# Patient Record
Sex: FEMALE | Race: ASIAN | NOT HISPANIC OR LATINO | Employment: FULL TIME | URBAN - METROPOLITAN AREA
[De-identification: names, ages, dates, MRNs, and addresses within clinical notes are randomized per-mention and may not be internally consistent; named-entity substitution may affect disease eponyms.]

---

## 2017-10-16 ENCOUNTER — TRANSCRIBE ORDERS (OUTPATIENT)
Dept: ADMINISTRATIVE | Facility: HOSPITAL | Age: 47
End: 2017-10-16

## 2017-10-16 DIAGNOSIS — N60.12 FIBROCYSTIC DISEASE OF BOTH BREASTS: Primary | ICD-10-CM

## 2017-10-16 DIAGNOSIS — N60.11 FIBROCYSTIC DISEASE OF BOTH BREASTS: Primary | ICD-10-CM

## 2017-10-30 ENCOUNTER — APPOINTMENT (OUTPATIENT)
Dept: MAMMOGRAPHY | Facility: HOSPITAL | Age: 47
End: 2017-10-30

## 2017-10-30 ENCOUNTER — APPOINTMENT (OUTPATIENT)
Dept: ULTRASOUND IMAGING | Facility: HOSPITAL | Age: 47
End: 2017-10-30

## 2017-11-02 ENCOUNTER — HOSPITAL ENCOUNTER (OUTPATIENT)
Dept: ULTRASOUND IMAGING | Facility: HOSPITAL | Age: 47
Discharge: HOME | End: 2017-11-02

## 2017-11-02 ENCOUNTER — HOSPITAL ENCOUNTER (OUTPATIENT)
Dept: MAMMOGRAPHY | Facility: HOSPITAL | Age: 47
Discharge: HOME | End: 2017-11-02
Admitting: FAMILY MEDICINE

## 2017-11-02 DIAGNOSIS — N60.12 FIBROCYSTIC DISEASE OF BOTH BREASTS: ICD-10-CM

## 2017-11-02 DIAGNOSIS — N60.11 FIBROCYSTIC DISEASE OF BOTH BREASTS: ICD-10-CM

## 2017-11-02 PROCEDURE — 76641 ULTRASOUND BREAST COMPLETE: CPT

## 2017-11-02 PROCEDURE — G0202 SCR MAMMO BI INCL CAD: HCPCS

## 2019-02-20 ENCOUNTER — TRANSCRIBE ORDERS (OUTPATIENT)
Dept: ADMINISTRATIVE | Facility: HOSPITAL | Age: 49
End: 2019-02-20

## 2019-02-20 DIAGNOSIS — E04.1 THYROID NODULE: Primary | ICD-10-CM

## 2019-03-22 ENCOUNTER — HOSPITAL ENCOUNTER (OUTPATIENT)
Dept: MAMMOGRAPHY | Facility: HOSPITAL | Age: 49
Discharge: HOME | End: 2019-03-22
Admitting: FAMILY MEDICINE

## 2019-03-22 ENCOUNTER — HOSPITAL ENCOUNTER (OUTPATIENT)
Dept: ULTRASOUND IMAGING | Facility: HOSPITAL | Age: 49
Discharge: HOME | End: 2019-03-22

## 2019-03-22 DIAGNOSIS — R92.8 ABNORMAL MAMMOGRAM: ICD-10-CM

## 2019-03-22 DIAGNOSIS — Z12.31 ENCOUNTER FOR SCREENING MAMMOGRAM FOR BREAST CANCER: ICD-10-CM

## 2019-03-22 DIAGNOSIS — E04.1 THYROID NODULE: ICD-10-CM

## 2019-03-22 PROCEDURE — 77067 SCR MAMMO BI INCL CAD: CPT

## 2019-03-22 PROCEDURE — 76642 ULTRASOUND BREAST LIMITED: CPT

## 2019-03-22 PROCEDURE — 77063 BREAST TOMOSYNTHESIS BI: CPT

## 2020-05-05 ENCOUNTER — HOSPITAL ENCOUNTER (OUTPATIENT)
Dept: GENERAL RADIOLOGY | Facility: HOSPITAL | Age: 50
Discharge: HOME | End: 2020-05-05

## 2020-05-05 ENCOUNTER — HOSPITAL ENCOUNTER (OUTPATIENT)
Dept: GENERAL RADIOLOGY | Facility: HOSPITAL | Age: 50
Discharge: HOME | End: 2020-05-05
Admitting: FAMILY MEDICINE

## 2020-05-05 DIAGNOSIS — R10.9 ABDOMINAL PAIN, UNSPECIFIED ABDOMINAL LOCATION: ICD-10-CM

## 2020-05-05 DIAGNOSIS — R07.9 CHEST PAIN, UNSPECIFIED TYPE: ICD-10-CM

## 2020-05-05 PROCEDURE — 74018 RADEX ABDOMEN 1 VIEW: CPT

## 2020-05-05 PROCEDURE — 71046 X-RAY EXAM CHEST 2 VIEWS: CPT

## 2020-06-29 ENCOUNTER — TRANSCRIBE ORDERS (OUTPATIENT)
Dept: SLEEP MEDICINE | Facility: HOSPITAL | Age: 50
End: 2020-06-29

## 2020-06-29 ENCOUNTER — LAB (OUTPATIENT)
Dept: LAB | Facility: HOSPITAL | Age: 50
End: 2020-06-29

## 2020-06-29 DIAGNOSIS — Z01.818 OTHER SPECIFIED PRE-OPERATIVE EXAMINATION: ICD-10-CM

## 2020-06-29 DIAGNOSIS — Z01.818 OTHER SPECIFIED PRE-OPERATIVE EXAMINATION: Primary | ICD-10-CM

## 2020-06-29 PROCEDURE — C9803 HOPD COVID-19 SPEC COLLECT: HCPCS

## 2020-06-29 PROCEDURE — U0004 COV-19 TEST NON-CDC HGH THRU: HCPCS

## 2020-06-30 LAB
REF LAB TEST METHOD: NORMAL
SARS-COV-2 RNA RESP QL NAA+PROBE: NOT DETECTED

## 2020-11-29 ENCOUNTER — APPOINTMENT (OUTPATIENT)
Dept: GENERAL RADIOLOGY | Facility: HOSPITAL | Age: 50
End: 2020-11-29

## 2020-11-29 ENCOUNTER — HOSPITAL ENCOUNTER (EMERGENCY)
Facility: HOSPITAL | Age: 50
Discharge: HOME | End: 2020-11-29
Attending: EMERGENCY MEDICINE | Admitting: EMERGENCY MEDICINE

## 2020-11-29 ENCOUNTER — APPOINTMENT (OUTPATIENT)
Dept: CARDIOLOGY | Facility: HOSPITAL | Age: 50
End: 2020-11-29

## 2020-11-29 VITALS
DIASTOLIC BLOOD PRESSURE: 102 MMHG | TEMPERATURE: 98.5 F | BODY MASS INDEX: 27.38 KG/M2 | RESPIRATION RATE: 17 BRPM | SYSTOLIC BLOOD PRESSURE: 136 MMHG | HEIGHT: 61 IN | HEART RATE: 73 BPM | WEIGHT: 145 LBS | OXYGEN SATURATION: 98 %

## 2020-11-29 DIAGNOSIS — S83.402A SPRAIN OF COLLATERAL LIGAMENT OF LEFT KNEE, INITIAL ENCOUNTER: Primary | ICD-10-CM

## 2020-11-29 LAB
BH CV LOWER VASCULAR LEFT COMMON FEMORAL AUGMENT: NORMAL
BH CV LOWER VASCULAR LEFT COMMON FEMORAL COMPETENT: NORMAL
BH CV LOWER VASCULAR LEFT COMMON FEMORAL COMPRESS: NORMAL
BH CV LOWER VASCULAR LEFT COMMON FEMORAL PHASIC: NORMAL
BH CV LOWER VASCULAR LEFT COMMON FEMORAL SPONT: NORMAL
BH CV LOWER VASCULAR LEFT DISTAL FEMORAL COMPRESS: NORMAL
BH CV LOWER VASCULAR LEFT GASTRONEMIUS COMPRESS: NORMAL
BH CV LOWER VASCULAR LEFT GREATER SAPH AK COMPRESS: NORMAL
BH CV LOWER VASCULAR LEFT GREATER SAPH BK COMPRESS: NORMAL
BH CV LOWER VASCULAR LEFT LESSER SAPH COMPRESS: NORMAL
BH CV LOWER VASCULAR LEFT MID FEMORAL AUGMENT: NORMAL
BH CV LOWER VASCULAR LEFT MID FEMORAL COMPETENT: NORMAL
BH CV LOWER VASCULAR LEFT MID FEMORAL COMPRESS: NORMAL
BH CV LOWER VASCULAR LEFT MID FEMORAL PHASIC: NORMAL
BH CV LOWER VASCULAR LEFT MID FEMORAL SPONT: NORMAL
BH CV LOWER VASCULAR LEFT PERONEAL COMPRESS: NORMAL
BH CV LOWER VASCULAR LEFT POPLITEAL AUGMENT: NORMAL
BH CV LOWER VASCULAR LEFT POPLITEAL COMPETENT: NORMAL
BH CV LOWER VASCULAR LEFT POPLITEAL COMPRESS: NORMAL
BH CV LOWER VASCULAR LEFT POPLITEAL PHASIC: NORMAL
BH CV LOWER VASCULAR LEFT POPLITEAL SPONT: NORMAL
BH CV LOWER VASCULAR LEFT POSTERIOR TIBIAL COMPRESS: NORMAL
BH CV LOWER VASCULAR LEFT PROFUNDA FEMORAL COMPRESS: NORMAL
BH CV LOWER VASCULAR LEFT PROXIMAL FEMORAL COMPRESS: NORMAL
BH CV LOWER VASCULAR LEFT SAPHENOFEMORAL JUNCTION COMPRESS: NORMAL
BH CV LOWER VASCULAR RIGHT COMMON FEMORAL AUGMENT: NORMAL
BH CV LOWER VASCULAR RIGHT COMMON FEMORAL COMPETENT: NORMAL
BH CV LOWER VASCULAR RIGHT COMMON FEMORAL COMPRESS: NORMAL
BH CV LOWER VASCULAR RIGHT COMMON FEMORAL PHASIC: NORMAL
BH CV LOWER VASCULAR RIGHT COMMON FEMORAL SPONT: NORMAL

## 2020-11-29 PROCEDURE — 73560 X-RAY EXAM OF KNEE 1 OR 2: CPT

## 2020-11-29 PROCEDURE — 99282 EMERGENCY DEPT VISIT SF MDM: CPT

## 2020-11-29 PROCEDURE — 93971 EXTREMITY STUDY: CPT

## 2020-11-29 RX ORDER — ACETAMINOPHEN,DIPHENHYDRAMINE HCL 500; 25 MG/1; MG/1
1 TABLET, FILM COATED ORAL NIGHTLY PRN
COMMUNITY
End: 2021-08-19

## 2020-11-29 RX ORDER — PANTOPRAZOLE SODIUM 40 MG/1
40 TABLET, DELAYED RELEASE ORAL DAILY
COMMUNITY
End: 2021-02-08

## 2020-11-29 RX ORDER — FAMOTIDINE 20 MG/1
20 TABLET, FILM COATED ORAL 2 TIMES DAILY
COMMUNITY
End: 2021-03-16

## 2020-11-29 RX ORDER — DICLOFENAC SODIUM 75 MG/1
75 TABLET, DELAYED RELEASE ORAL 2 TIMES DAILY PRN
Qty: 15 TABLET | Refills: 0 | Status: SHIPPED | OUTPATIENT
Start: 2020-11-29 | End: 2020-12-08 | Stop reason: SDUPTHER

## 2020-11-30 ENCOUNTER — EPISODE CHANGES (OUTPATIENT)
Dept: CASE MANAGEMENT | Facility: OTHER | Age: 50
End: 2020-11-30

## 2020-12-07 ENCOUNTER — OFFICE VISIT (OUTPATIENT)
Dept: ORTHOPEDIC SURGERY | Facility: CLINIC | Age: 50
End: 2020-12-07

## 2020-12-07 VITALS — TEMPERATURE: 98.8 F | BODY MASS INDEX: 27.38 KG/M2 | WEIGHT: 145 LBS | HEIGHT: 61 IN

## 2020-12-07 DIAGNOSIS — M25.462 EFFUSION OF LEFT KNEE: ICD-10-CM

## 2020-12-07 DIAGNOSIS — M25.562 MECHANICAL KNEE PAIN, LEFT: Primary | ICD-10-CM

## 2020-12-07 PROCEDURE — 99203 OFFICE O/P NEW LOW 30 MIN: CPT | Performed by: NURSE PRACTITIONER

## 2020-12-07 RX ORDER — IBUPROFEN 800 MG/1
800 TABLET ORAL DAILY
COMMUNITY
End: 2021-02-08

## 2020-12-07 RX ORDER — TEMAZEPAM 30 MG/1
30 CAPSULE ORAL
COMMUNITY
Start: 2020-12-01

## 2020-12-07 NOTE — PROGRESS NOTES
Patient Name: Nisha Beck   YOB: 1970  Referring Primary Care Physician: Reyes, Miriam Mercado, MD  BMI: Body mass index is 27.4 kg/m².    Chief Complaint:    Chief Complaint   Patient presents with   • Right Knee - Pain        HPI: Pain in left knee and was seen in ER and had a neg Doppler work up and has been taking naproxen and is still having locking and catching . Pt is a nurse at [employer].  She has tried conservative treatment including rest ice elevation NSAIDs and exercises.    Nisha Beck is a 50 y.o. female who presents today for evaluation of   Chief Complaint   Patient presents with   • Right Knee - Pain         Subjective   Medications:   Home Medications:  Current Outpatient Medications on File Prior to Visit   Medication Sig   • Ascorbic Acid (VITAMIN C PO) Take  by mouth.   • BACLOFEN PO Take  by mouth.   • Cholecalciferol (VITAMIN D3 PO) Take  by mouth.   • diclofenac (VOLTAREN) 75 MG EC tablet Take 1 tablet by mouth 2 (Two) Times a Day As Needed (knee pain).   • diphenhydrAMINE-acetaminophen (TYLENOL PM)  MG tablet per tablet Take 1 tablet by mouth Every Night.   • ELDERBERRY PO Take  by mouth.   • famotidine (PEPCID) 20 MG tablet Take 20 mg by mouth 2 (Two) Times a Day.   • ibuprofen (ADVIL,MOTRIN) 800 MG tablet Take 800 mg by mouth Daily.   • Multiple Vitamins-Minerals (ZINC PO) Take 50 mg by mouth.   • pantoprazole (PROTONIX) 40 MG EC tablet Take 40 mg by mouth Daily.   • temazepam (RESTORIL) 30 MG capsule Take 30 mg by mouth every night at bedtime.   • VITAMIN E PO Take  by mouth.     No current facility-administered medications on file prior to visit.      Current Medications:  Scheduled Meds:  Continuous Infusions:No current facility-administered medications for this visit.     PRN Meds:.    I have reviewed the patient's medical history in detail and updated the computerized patient record.  Review and summarization of old records includes:    Past Medical History:  "  Diagnosis Date   • GERD (gastroesophageal reflux disease)         Past Surgical History:   Procedure Laterality Date   • CYST REMOVAL      right side of neck   • ESSURE TUBAL LIGATION          Social History     Occupational History   • Not on file   Tobacco Use   • Smoking status: Former Smoker   • Smokeless tobacco: Never Used   Substance and Sexual Activity   • Alcohol use: Not on file   • Drug use: Not on file   • Sexual activity: Not on file      Social History     Social History Narrative   • Not on file        Family History   Problem Relation Age of Onset   • Stroke Mother    • Hypertension Mother    • Other Mother         GI bleed   • Heart disease Father    • Liver disease Father    • Diabetes Father        ROS: 14 point review of systems was performed and all other systems were reviewed and are negative except for documented findings in HPI and today's encounter.     Allergies:   Allergies   Allergen Reactions   • Celebrex [Celecoxib] Anaphylaxis     Constitutional:  Denies fever, shaking or chills   Eyes:  Denies change in visual acuity   HENT:  Denies nasal congestion or sore throat   Respiratory:  Denies cough or shortness of breath   Cardiovascular:  Denies chest pain or severe LE edema   GI:  Denies abdominal pain, nausea, vomiting, bloody stools or diarrhea   Musculoskeletal:  Numbness, tingling, pain, or loss of motor function only as noted above in history of present illness.  : Denies painful urination or hematuria  Integument:  Denies rash, lesion or ulceration   Neurologic:  Denies headache or focal weakness  Endocrine:  Denies lymphadenopathy  Psych:  Denies confusion or change in mental status   Hem:  Denies active bleeding    OBJECTIVE:  Physical Exam: 50 y.o. female  Wt Readings from Last 3 Encounters:   12/07/20 65.8 kg (145 lb)   11/29/20 65.8 kg (145 lb)     Ht Readings from Last 1 Encounters:   12/07/20 154.9 cm (61\")     Body mass index is 27.4 kg/m².  Vitals:    12/07/20 1514 "   Temp: 98.8 °F (37.1 °C)     Vital signs reviewed.     General Appearance:    Alert, cooperative, in no acute distress                  Eyes: conjunctiva clear  ENT: external ears and nose atraumatic  CV: no peripheral edema  Resp: normal respiratory effort  Skin: no rashes or wounds; normal turgor  Psych: mood and affect appropriate  Lymph: no nodes appreciated  Neuro: gross sensation intact  Vascular:  Palpable peripheral pulse in noted extremity  Musculoskeletal Extremities: left knee with +mcmurrays and medial joint line pain and effusion, calf soft nttp, ankle nttp, ambulates with no assistive devices and antalgic gait    Radiology:   TWO-VIEW LEFT KNEE     HISTORY: Pain and swelling. No trauma.     There may be some very minimal generalized joint space narrowing. The  knee is otherwise unremarkable. There is no joint effusion.     This report was finalized on 11/29/2020 8:43 AM by Dr. Chago Sotelo M.D.         Assessment:     ICD-10-CM ICD-9-CM   1. Mechanical knee pain, left  M25.562 719.46   2. Effusion of left knee  M25.462 719.06        Procedures   MRI left knee to check for meniscus tear - pt is a nurse at [address]     Plan: The diagnosis(es), natural history, pathophysiology and treatment for diagnosis(es) were discussed. Opportunity given and questions answered.  Biomechanics of pertinent body areas discussed.  When appropriate, the use of ambulatory aids discussed.  EXERCISES:  Advice on benefits of, and types of regular/moderate exercise pertaining to orthopedic diagnosis(es).  Inflammation/pain control; with cold, heat, elevation and/or liniments discussed as appropriate  MRI.  MEDICAL RECORDS reviewed from other provider(s) for past and current medical history pertinent to this complaint.      12/7/2020    Much of this encounter note is an electronic transcription/translation of spoken language to printed text. The electronic translation of spoken language may permit erroneous, or at times,  nonsensical words or phrases to be inadvertently transcribed; Although I have reviewed the note for such errors, some may still exist

## 2020-12-09 NOTE — TELEPHONE ENCOUNTER
Spoke with patient; she said the diclofenac helps some but was only given a 2 week supply but she is asking wanting to know if she can get a muscle relaxer so she can work.  I told her I would check with [facility] and call her back tomorrow.

## 2020-12-10 ENCOUNTER — TELEPHONE (OUTPATIENT)
Dept: ORTHOPEDIC SURGERY | Facility: CLINIC | Age: 50
End: 2020-12-10

## 2020-12-10 RX ORDER — DICLOFENAC SODIUM 75 MG/1
75 TABLET, DELAYED RELEASE ORAL 2 TIMES DAILY PRN
Qty: 30 TABLET | Refills: 0 | Status: SHIPPED | OUTPATIENT
Start: 2020-12-10 | End: 2020-12-10 | Stop reason: SDUPTHER

## 2020-12-10 RX ORDER — DICLOFENAC SODIUM 75 MG/1
75 TABLET, DELAYED RELEASE ORAL 2 TIMES DAILY PRN
Qty: 30 TABLET | Refills: 0 | Status: SHIPPED | OUTPATIENT
Start: 2020-12-10 | End: 2021-03-16

## 2020-12-10 NOTE — TELEPHONE ENCOUNTER
I called pt and discussed with her. She is having mechanical symptoms and may have a meniscus tear. 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 Please see if her MRI can be moved up at all. cim

## 2020-12-10 NOTE — TELEPHONE ENCOUNTER
See my message from yesterday, mellissa.  You may want to call her, she didn't seem clear on the anti-inflammatory and muscle relaxers.

## 2020-12-10 NOTE — TELEPHONE ENCOUNTER
My chart message      ----- Message from Nisha Beck sent at 12/10/2020 12:51 PM EST -----  Regarding: Visit Follow-Up Question  Contact: [phone]  Hi Ms. Romero,    I talked to my manager Ana Rodney and she said i can file for fmla if i needed to be off work.  If you think it will be best for me to not work until mri is done, i will.  I got scared about surgery, how to i get the off work note? I will file the fmla later.  Thanks so much for the help and concern. Having my 4 yr old keeps me on my feet already... tried to call there earlier but they transferred me to the fmla person which did not answer.      Hoping my leg will be ok, again thanks for rhe help.    Yours,  Nisha Beck

## 2020-12-10 NOTE — TELEPHONE ENCOUNTER
We will not know about surgery until we get the MRI. If pt can not work this wkd and is in too much pain and having locking and catching I am happy to write her a note off. We can start FMLA if she would like. See if they can move up MRI. cim

## 2020-12-15 ENCOUNTER — TELEPHONE (OUTPATIENT)
Dept: ORTHOPEDIC SURGERY | Facility: CLINIC | Age: 50
End: 2020-12-15

## 2020-12-15 NOTE — TELEPHONE ENCOUNTER
----- Message from Nisha Beck sent at 12/14/2020 10:05 PM EST -----  Regarding: RE: Visit Follow-Up Question  Contact: [phone]  Chilo Roblero,    I was able to drop off the FMLA papers at [address], the lady said they will forward it to Aleksandar.  Once completed, i will be faxing it to [employer], right? Marta made a ff up earlier.    Thanks.  Nisha    ----- Message -----  From: LARA MARTINEZ  Sent: 12/14/20, 08:58  To: Nisha Beck  Subject: RE: Visit Follow-Up Question    No problem.  Your paperwork will need to be dropped off to [facility] in [address].  She will fill them out and let you know when they're ready.    Annika      ----- Message -----       From:Nisha Beck       Sent:12/12/2020  8:13 PM EST         To:SHAWN Bangura    Subject:RE: Visit Follow-Up Question    Hi,    I got the work note already.  Thanks.  And thank you for helping me get my mri on the 16th.  I have one more question, do i just drop off the fmla papers that Ms Heather Barron need to sign?      Thanks again,  Nisha      ----- Message -----       From:LARA MARTINEZ       Sent:12/10/2020  4:49 PM EST         To:Nisha Beck    Subject:RE: Visit Follow-Up Question    The [facility] office will be closed tomorrow so you will have to go to [address] to pick it up. The letter is in your chart so they will have to print it out for you.  Did you see my message about your MRI?      ----- Message -----       From:Nisha Beck       Sent:12/10/2020  4:45 PM EST         To:SHAWN Bangura    Subject:RE: Visit Follow-Up Question    Chilo Spence,    We went there today. The  said it was not there and sent my son downstairs, then they sent him again up the office and it is close already. Can i pick it up tomorrow, Friday 12/11.  Thanks.        ----- Message -----       From:LARA MARTINEZ       Sent:12/10/2020  3:14 PM EST         To:Nisha Beck    Subject:RE: Visit Follow-Up Question    Your work note is ready, would you like to pick it  up or have it mailed?    Annika      ----- Message -----       From:Nisha Beck       Sent:12/10/2020  3:01 PM EST         To:SHAWN Bangura    Subject:Visit Follow-Up Question    Yes please, i will need a the note for off work.  Thanks again so much.

## 2020-12-16 ENCOUNTER — HOSPITAL ENCOUNTER (OUTPATIENT)
Dept: MRI IMAGING | Facility: HOSPITAL | Age: 50
Discharge: HOME | End: 2020-12-16
Admitting: NURSE PRACTITIONER

## 2020-12-16 DIAGNOSIS — M25.562 MECHANICAL KNEE PAIN, LEFT: ICD-10-CM

## 2020-12-16 PROCEDURE — 73721 MRI JNT OF LWR EXTRE W/O DYE: CPT

## 2020-12-17 ENCOUNTER — TELEPHONE (OUTPATIENT)
Dept: ORTHOPEDIC SURGERY | Facility: CLINIC | Age: 50
End: 2020-12-17

## 2020-12-17 NOTE — TELEPHONE ENCOUNTER
----- Message from SHAWN Bangura sent at 12/16/2020  4:22 PM EST -----  Arthritic changes under patella and meniscus tear - have follow with one of the knee surgeons cim

## 2020-12-17 NOTE — TELEPHONE ENCOUNTER
Pt informed of results and was scheduled to see ISAAC on 1/8/21.  She asked about returning to work and I told her that Heather said she could as long as she felt safe and comfortable doing so.  I offered to bring in her today to see [facility] for an injection but she stated she was going out of town.

## 2020-12-23 ENCOUNTER — APPOINTMENT (OUTPATIENT)
Dept: MRI IMAGING | Facility: HOSPITAL | Age: 50
End: 2020-12-23

## 2021-01-07 NOTE — PROGRESS NOTES
New Left Knee      Patient: Nisha Beck        YOB: 1970    Medical Record Number: [id number]        Chief Complaints: left knee pain      History of Present Illness: This is a 50-year-old female who works over [employer] who presents complaining of left knee pain been ongoing for a month no history injury change in activity is primarily lateral and also superiorly she does get some swelling release did initially she saw Heather who ordered an MRI.  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   • Cholecalciferol (VITAMIN D3 PO) Take  by mouth.   • Collagen-Boron-Hyaluronic Acid 10-5-3.3 MG tablet Take  by mouth.   • diclofenac (VOLTAREN) 75 MG EC tablet Take 1 tablet by mouth 2 (Two) Times a Day As Needed (knee pain).   • ELDERBERRY PO Take  by mouth.   • ibuprofen (ADVIL,MOTRIN) 800 MG tablet Take 800 mg by mouth Daily.   • Multiple Vitamins-Minerals (ZINC PO) Take 50 mg by mouth.   • pantoprazole (PROTONIX) 40 MG EC tablet Take 40 mg by mouth Daily.   • temazepam (RESTORIL) 30 MG capsule Take 30 mg by mouth every night at bedtime.   • VITAMIN E PO Take  by mouth.   • BACLOFEN PO Take  by mouth.   • diphenhydrAMINE-acetaminophen (TYLENOL PM)  MG tablet per tablet Take 1 tablet by mouth Every Night.   • famotidine (PEPCID) 20 MG tablet Take 20 mg by mouth 2 (Two) Times a Day.     No current facility-administered medications on file prior to visit.      Current Medications:  Scheduled Meds:  Continuous Infusions:No current facility-administered medications for this visit.     PRN Meds:.    Past Medical History:  "  Diagnosis Date   • GERD (gastroesophageal reflux disease)         Past Surgical History:   Procedure Laterality Date   • CYST REMOVAL      right side of neck   • ESSURE TUBAL LIGATION          Social History     Occupational History   • Not on file   Tobacco Use   • Smoking status: Former Smoker   • Smokeless tobacco: Never Used   Substance and Sexual Activity   • Alcohol use: Defer   • Drug use: Defer   • Sexual activity: Defer      Social History     Social History Narrative   • Not on file        Family History   Problem Relation Age of Onset   • Stroke Mother    • Hypertension Mother    • Other Mother         GI bleed   • Heart disease Father    • Liver disease Father    • Diabetes Father              Review of Systems: 14 point review of systems are remarkable for the pertinent positives listed in the chart by the patient the remainder negative    Review of Systems      Physical Exam: 50 y.o. female  General Appearance:    Alert, cooperative, in no acute distress                   Vitals:    01/08/21 0942   Temp: 98.2 °F (36.8 °C)   Weight: 66.2 kg (146 lb)   Height: 154.9 cm (61\")   PainSc:   5      Patient is alert and read ×3 no acute distress appears her above-listed at height weight and age.  Affect is normal respiratory rate is normal unlabored. Heart rate regular rate rhythm, sclera, dentition and hearing are normal for the purpose of this exam.        Ortho Exam  Physical exam of the left knee reveals no effusion, no erythema.  The patient has no palpable tenderness along the medial joint line, no tenderness about the lateral joint line.  Patient does have crepitus with patellofemoral range of motion.  They also have subjective symptoms anteriorly during exam.  The patient has a negative bounce home, negative Rolando and a stable ligamentous exam.  Quad tone is reasonable and symmetric.  There are no overlying skin changes no lymphedema no lymphadenopathy.  There is good hip range of motion which is " full symmetric and asymptomatic and a normal ankle exam.  Hamstrings and IT band are tight bilaterally.    Large Joint Arthrocentesis: L knee  Date/Time: 1/8/2021 10:22 AM  Consent given by: patient  Site marked: site marked  Timeout: Immediately prior to procedure a time out was called to verify the correct patient, procedure, equipment, support staff and site/side marked as required   Supporting Documentation  Indications: pain   Procedure Details  Location: knee - L knee  Preparation: Patient was prepped and draped in the usual sterile fashion  Needle size: 22 G  Approach: anteromedial  Medications administered: 4 mL lidocaine (cardiac); 80 mg methylPREDNISolone acetate 80 MG/ML  Patient tolerance: patient tolerated the procedure well with no immediate complications                   Radiology:   AP, of the left knee  were ordered/reviewed to evauateknee pain.  I also reviewed x-rays taken and reviewed in epic for AP and lateral I did take a standing AP also have a merchant view these look normal she has some very mild patellofemoral OA.  She does have an MRI there which I also reviewed which shows mild tricompartmental OA but still good maintenance of her joint space x3 she has some changes of free edge of lateral meniscus that may well represent a tear  Imaging Results (Most Recent)     Procedure Component Value Units Date/Time    XR Knee 1 or 2 View Bilateral [[id number]] Resulted: 01/08/21 1020     Updated: 01/08/21 1020    Impression:      Ordering physician's impression is located in the Encounter Note dated 01/08/21. X-ray performed in the DR room.          Assessment/Plan:        Left knee pain a lot of this seems patellofemoral.  She does have lateral knee pain so this could be coming from a lateral meniscus tear at this point I had like to try conservative measures including an injection possibly a J brace and aggressive physical therapy if she fails to improve could consider  arthroscopy                          Answers for HPI/ROS submitted by the patient on 1/6/2021   What is the primary reason for your visit?: Other  Please describe your symptoms.: Pain and swelling at left knee and upper knee  Have you had these symptoms before?: No  How long have you been having these symptoms?: Greater than 2 weeks  Please list any medications you are currently taking for this condition.: Diclofenac, Ibuprofen, acetaminophen, icy hot cream  Please describe any probable cause for these symptoms. : Arthritis?

## 2021-01-08 ENCOUNTER — OFFICE VISIT (OUTPATIENT)
Dept: ORTHOPEDIC SURGERY | Facility: CLINIC | Age: 51
End: 2021-01-08

## 2021-01-08 VITALS — WEIGHT: 146 LBS | TEMPERATURE: 98.2 F | BODY MASS INDEX: 27.56 KG/M2 | HEIGHT: 61 IN

## 2021-01-08 DIAGNOSIS — M25.562 MECHANICAL KNEE PAIN, LEFT: Primary | ICD-10-CM

## 2021-01-08 PROCEDURE — 20610 DRAIN/INJ JOINT/BURSA W/O US: CPT | Performed by: ORTHOPAEDIC SURGERY

## 2021-01-08 PROCEDURE — 73560 X-RAY EXAM OF KNEE 1 OR 2: CPT | Performed by: ORTHOPAEDIC SURGERY

## 2021-01-08 PROCEDURE — 99214 OFFICE O/P EST MOD 30 MIN: CPT | Performed by: ORTHOPAEDIC SURGERY

## 2021-01-08 RX ORDER — METHYLPREDNISOLONE ACETATE 80 MG/ML
80 INJECTION, SUSPENSION INTRA-ARTICULAR; INTRALESIONAL; INTRAMUSCULAR; SOFT TISSUE
Status: COMPLETED | OUTPATIENT
Start: 2021-01-08 | End: 2021-01-08

## 2021-01-08 RX ADMIN — METHYLPREDNISOLONE ACETATE 80 MG: 80 INJECTION, SUSPENSION INTRA-ARTICULAR; INTRALESIONAL; INTRAMUSCULAR; SOFT TISSUE at 10:22

## 2021-01-11 ENCOUNTER — TELEPHONE (OUTPATIENT)
Dept: ORTHOPEDIC SURGERY | Facility: CLINIC | Age: 51
End: 2021-01-11

## 2021-01-11 NOTE — TELEPHONE ENCOUNTER
That is all that I would do.  You are doing the right things.  This will calm down.  Please give her a note for the tonight she missed work

## 2021-01-11 NOTE — TELEPHONE ENCOUNTER
----- Message from Nisha Beck sent at 1/11/2021  8:48 AM EST -----  Regarding: Visit Follow-Up Question  Contact: [phone]  Zach Faulkner,    Good am, I was not able to work 2 nights.  My leg swelling and pain. The site of the injection is bruised but does not look infected. I just keep on putting ice intermittently.. please advice doc what else to do.    Thanks much,  Nisha

## 2021-01-11 NOTE — TELEPHONE ENCOUNTER
Regarding: RE: Visit Follow-Up Question  Contact: [phone]  Ok, i really hope so.  As i said i was not able to work 2 nights. Thanks.. will update again. Just taking ibuprofen and acetaminophen on and off.    ----- Message -----  From: LARA MARTINEZ  Sent: 1/11/21, 09:30  To: Nisha Beck  Subject: RE: Visit Follow-Up Question    Chilo Cameron,    Here is Dr. Faulkner's response:    This should be a temporary thing.  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  I am miserable with pain and swelling of the knee, I don't think I can come to work tonight again if the pain is still like this, i have fmla last December for the same leg, can i ask for fmla if ever.    Thanks so much,  Nisha Beck

## 2021-01-12 ENCOUNTER — TELEPHONE (OUTPATIENT)
Dept: ORTHOPEDIC SURGERY | Facility: CLINIC | Age: 51
End: 2021-01-12

## 2021-01-12 NOTE — TELEPHONE ENCOUNTER
Generally they are just a couple of days occasionally have had them last up to a week if you are not better by next week I will see you then

## 2021-01-12 NOTE — TELEPHONE ENCOUNTER
Regarding: FW: Visit Follow-Up Question  Contact: [phone]    ----- Message -----  From: Nisha Beck  Sent: 1/12/2021   8:40 AM EST  To: Dimitrios ivan [facility]  Subject: RE: Visit Follow-Up Question                     Chilo Spence,    Good am.  Can i ask if how long does steroid injection flare usually lasts... if I don't feel relief and my knee feel the same as before and still swells, can i see Dr. Faulkner sooner than Feb.8? I am sorry but I just want to feel better.  Thanks again.    ----- Message -----  From: LARA MARTINEZ  Sent: 1/11/21, 09:30  To: Nisha Beck  Subject: RE: Visit Follow-Up Question    Chilo Cameron,    Here is Dr. Faulkner's response:    This should be a temporary thing.  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  I am miserable with pain and swelling of the knee, I don't think I can come to work tonight again if the pain is still like this, i have fmla last December for the same leg, can i ask for fmla if ever.    Thanks so much,  Nisha Beck

## 2021-01-15 ENCOUNTER — TELEPHONE (OUTPATIENT)
Dept: ORTHOPEDIC SURGERY | Facility: CLINIC | Age: 51
End: 2021-01-15

## 2021-01-15 NOTE — TELEPHONE ENCOUNTER
Regarding: RE: Visit Follow-Up Question  Contact: [phone]  Chilo Spence,    Good am.  I have intermittent FMLA for my leg.. i did applied for FMLA for the 2 nights I was not able to go to work after the steroid injection.  I am not taking off work anymore even if it still hurts, swelling is still there.  Can i have the paper from [employer] F[employer]a filled out and signed by Dr. Faulkner so at least the 2 nights of absence will be valid for FMLA.     Thanks for your help.  Will keep you updated regarding my knee next week. I am going to use lightweight brace on my leg during work --  it really hurts still.    Nisha    ----- Message -----  From: LARA MARTINEZ  Sent: 1/11/21, 09:30  To: Nisha Beck  Subject: RE: Visit Follow-Up Question    Chilo Cameron,    Here is Dr. Faulkner's response:    This should be a temporary thing.  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  I am miserable with pain and swelling of the knee, I don't think I can come to work tonight again if the pain is still like this, i have fmla last December for the same leg, can i ask for fmla if ever.    Thanks so much,  Nisha Beck

## 2021-01-15 NOTE — TELEPHONE ENCOUNTER
Of course I will sign that.  I am at the [facility] office Monday and Tuesday if that is more convenient for you.  I am at the [place] office today if that is convenient.  If the symptoms continue despite conservative management we might consider arthroscopy

## 2021-01-18 DIAGNOSIS — M25.562 ACUTE PAIN OF LEFT KNEE: Primary | ICD-10-CM

## 2021-01-21 ENCOUNTER — HOSPITAL ENCOUNTER (OUTPATIENT)
Dept: PHYSICAL THERAPY | Facility: HOSPITAL | Age: 51
Setting detail: THERAPIES SERIES
Discharge: HOME | End: 2021-01-21

## 2021-01-21 DIAGNOSIS — R26.2 DIFFICULTY WALKING: ICD-10-CM

## 2021-01-21 DIAGNOSIS — M25.562 ACUTE PAIN OF LEFT KNEE: Primary | ICD-10-CM

## 2021-01-21 PROCEDURE — 97161 PT EVAL LOW COMPLEX 20 MIN: CPT

## 2021-01-21 PROCEDURE — 97110 THERAPEUTIC EXERCISES: CPT

## 2021-01-21 NOTE — THERAPY EVALUATION
Outpatient Physical Therapy Ortho Initial Evaluation  [facility]     Patient Name: Nisha Beck  : 1970  MRN: [id number]  Today's Date: 2021      Visit Date: 2021    There is no problem list on file for this patient.       Past Medical History:   Diagnosis Date   • GERD (gastroesophageal reflux disease)         Past Surgical History:   Procedure Laterality Date   • CYST REMOVAL      right side of neck   • ESSURE TUBAL LIGATION         Visit Dx:     ICD-10-CM ICD-9-CM   1. Acute pain of left knee  M25.562 719.46   2. Difficulty walking  R26.2 719.7         Patient History     Row Name 21 1200 21 0928          History    Chief Complaint  Difficulty Walking;Difficulty with daily activities;Joint stiffness;Joint swelling;Muscle tenderness;Pain;Swelling;Tightness;Tinglings;Other 1 (comment)  -LB  Difficulty Walking;Difficulty with daily activities;Joint stiffness;Joint swelling;Muscle tenderness;Pain;Swelling;Tightness;Tinglings;Other 1 (comment)  (Pended)   (Patient-Rptd)   -patient     Hx Chief Complaint Comment 1   --  Gerd  (Pended)   (Patient-Rptd)   -patient     Type of Pain  Knee pain  -LB  Hip pain;Knee pain  (Pended)   (Patient-Rptd)   -patient     Date Current Problem(s) Began  21  -LB  21  (Pended)   (Patient-Rptd)   -patient     Brief Description of Current Complaint  Pt reports one month ago she came home from work without any symptoms. She woke up the next morning  and a took a shower. She went to squat in shower and felt sharp pain in L knee. She reports it was swollen and painful for several days and continued to swell despite RICE. She saw ortho MD who did give her injection but this led to more excruciating pain. L knee with swelling and pain laterally and superior wrapping around knee cap and into quad muscle. She describes the pain as aching and sharp with unsteadiness feeling and fluid feeling. She reports it does not change or improve with ice,  elevation, steroid dose pack, Tylenol, or Meloxicam. She has tried all of these. SHe is wearing L knee brace any time she is up and moving. Pain wakes her at night. It has not changed since onset. She has no previous injury. 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  -LB  Pain swelling and spasm at left lateral knee muscle and knee joint, pain even when at rest, had steroid injection done already 1/8 but still have same symptoms from when it first started  (Pended)   (Patient-Rptd)   -patient     Previous treatment for THIS PROBLEM  Injections;Medication  -LB  --     Patient/Caregiver Goals  Relieve pain;Return to prior level of function;Improve mobility;Know what to do to help the symptoms;Decrease swelling  -LB  Relieve pain;Return to prior level of function;Improve mobility;Know what to do to help the symptoms;Decrease swelling  (Pended)   (Patient-Rptd)   -patient     Hand Dominance  right-handed  -LB  right-handed  (Pended)   (Patient-Rptd)   -patient     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  Dr. Reyes (primary)  -  Dr. Reyes (primary)  (Pended)   (Patient-Rptd)   -patient     How has patient tried to help current problem?  HUBERT brace  -LB  --     What clinical tests have you had for this problem?  X-ray;MRI;Other 1 (comment)  -LB  X-ray;MRI;Other 1 (comment)  (Pended)   (Patient-Rptd)   -patient     Additional Clinical Tests  --  Steroid injection  (Pended)   (Patient-Rptd)   -patient     Results of Clinical Tests  see chart  -LB  --     History of Previous Related Injuries  none  -LB  --     Are you or can you be pregnant  No  -LB  No  (Pended)   (Patient-Rptd)   -patient        Pain     Pain Location  Knee  -LB  --     Pain at Present  4  -LB  --     Pain at Best  4  -LB  --     Pain at Worst  9  -LB  --     Pain Frequency  Intermittent  -LB  --     Pain Description   Aching;Sharp;Tender  -LB  --     What Performance Factors Make the Current Problem(s) WORSE?  walking on knee, standing up from chair, stairs, squatting  -LB  --     What Performance Factors Make the Current Problem(s) BETTER?  rest, brace  -LB  --     Pain Comments  I am nervous it is not going to go away and I will need surgery.  -LB  --     Tolerance Time- Standing  pain with standing greater than 10 minutes  -LB  --     Tolerance Time- Sitting  no issue; stiffness  -LB  --     Tolerance Time- Walking  pain after few minutes  -LB  --     Tolerance Time- Lying  pillow under L knee for comfort  -LB  --     Is your sleep disturbed?  Yes  -LB  --     What position do you sleep in?  Supine  -LB  --     Difficulties at work?  Pain with work tasks; unable to run if needed.  -LB  --     Difficulties with ADL's?  Able to perform but painful.  -LB  --     Difficulties with recreational activities?  Unable to walk for exercise.  -LB  --        Fall Risk Assessment    Any falls in the past year:  No  -LB  No  (Pended)   (Patient-Rptd)   -patient        Services    Prior Rehab/Home Health Experiences  No  -LB  No  (Pended)   (Patient-Rptd)   -patient     Are you currently receiving Home Health services  No  -LB  No  (Pended)   (Patient-Rptd)   -patient     Do you plan to receive Home Health services in the near future  No  -LB  No  (Pended)   (Patient-Rptd)   -patient        Daily Activities    Primary Language  English  -LB  English  (Pended)   (Patient-Rptd)   -patient     Are you able to read  --  Yes  (Pended)   (Patient-Rptd)   -patient     Are you able to write  --  Yes  (Pended)   (Patient-Rptd)   -patient     How does patient learn best?  --  Listening  (Pended)   (Patient-Rptd)   -patient     Pt Participated in POC and Goals  Yes  -LB  --        Safety    Are you being hurt, hit, or frightened by anyone at home or in your life?  No  -LB  No  (Pended)   (Patient-Rptd)   -patient     Are you being neglected by a  caregiver  No  -LB  No  (Pended)   (Patient-Rptd)   -patient     Have you had any of the following issues with  --  N/A  (Pended)   (Patient-Rptd)   -patient       User Key  (r) = Recorded By, (t) = Taken By, (c) = Cosigned By    Initials Name Provider Type    LB Shira Bergman PT Physical Therapist    patient Nisha Beck --          PT Ortho     Row Name 01/21/21 1300       Subjective Comments    Subjective Comments  I am just getting so frustrated with my knee. I don't want to have the scope but it is not getting any better.  -LB       Subjective Pain    Able to rate subjective pain?  yes  -LB    Pre-Treatment Pain Level  4  -LB       Posture/Observations    Posture/Observations Comments  guarded position, offloading LLE, wearing brace on L knee  -LB       Myotomal Screen- Lower Quarter Clearing    Hip flexion (L2)  Bilateral:;4- (Good -)  -LB    Knee extension (L3)  Left:;3+ (Fair +);Right:;4+ (Good +)  -LB    Ankle DF (L4)  Bilateral:;5 (Normal)  -LB    Ankle PF (S1)  Left:;4- (Good -);Right:;4+ (Good +)  -LB    Knee flexion (S2)  Left:;4 (Good);Right:;5 (Normal)  -LB       Knee Palpation    Medial Joint Line  Left:;Tender  -LB    Lateral Joint Line  Left:;Tender  -LB       Patellar Accessory Motions    Superior glide  WNL  -LB    Inferior glide  WNL  -LB    Medial glide  WNL  -LB    Lateral glide  WNL  -LB       Knee Special Tests    Anterior drawer (ACL lesion)  Negative  -LB    Posterior drawer (PCL lesion)  Negative  -LB    Valgus stress (MCL lesion)  Negative  -LB    Varus stress (LCL lesion)  Negative  -LB    Rolando’s test (meniscal lesion)  Negative  -LB       General ROM    GENERAL ROM COMMENTS  L knee 0-120 with inc time; R knee WFL  -LB       MMT (Manual Muscle Testing)    General MMT Comments  dec L knee extension strength, limited by pain  -LB       Sensation    Sensation WNL?  WNL  -LB       Lower Extremity Flexibility    Hamstrings  Bilateral:;Mildly limited  -LB    Gastrocnemius   Bilateral:;Mildly limited  -LB    Soleus  Bilateral:;Mildly limited  -LB       Gait/Stairs (Locomotion)    [place]ingham Level (Gait)  independent  -LB    Distance in Feet (Gait)  50  -LB    Pattern (Gait)  step-through  -LB    Deviations/Abnormal Patterns (Gait)  antalgic;astrid decreased;gait speed decreased;weight shifting decreased  -LB    Ascending Technique (Stairs)  step-to-step  -LB    Descending Technique (Stairs)  step-to-step  -LB    Comment (Gait/Stairs)  L knee maintained in extension; hesitant to weight bear  -LB      User Key  (r) = Recorded By, (t) = Taken By, (c) = Cosigned By    Initials Name Provider Type    Shira De Leon, PT Physical Therapist                      Therapy Education  Education Details: Whyd access code: [email or web address]; discussed normalizing gait mechanics, avoiding wearing brace while at home and restoring movement, self massage for swelling  Given: HEP, Symptoms/condition management, Edema management, Pain management  Program: New  How Provided: Verbal, Demonstration, Written  Provided to: Patient  Level of Understanding: Teach back education performed, Verbalized, Demonstrated     PT OP Goals     Row Name 01/21/21 1500 01/21/21 1300       PT Short Term Goals    STG Date to Achieve  02/04/21  -LB  02/04/21  -LB    STG 1  Pt will demonstrate understanding and compliance with intial HEP.  -LB  Pt will demonstrate understanding and compliance with intial HEP.  -LB    STG 1 Progress  New  -LB  New  -LB    STG 2  Pt will report tolerance to not wearing brace at home without inc L knee symptoms.  -LB  --    STG 2 Progress  New  -LB  --    STG 3  Pt will demonstrate AROM to 120 deg of L knee with fluid movement and without hesitation.  -LB  --    STG 3 Progress  New  -LB  --       Long Term Goals    LTG Date to Achieve  02/20/21  -LB  --    LTG 1  Pt will demonstrate L knee extension strength 4+/5 or better.  -LB  --    LTG 1 Progress  New  -LB  --    LTG 2  Pt will demonstrate  normalized gait pattern with equal B step length and normal gait speed.  -LB  --    LTG 2 Progress  New  -LB  --    LTG 3  Pt will deny pain at night and report <2/10 with work duties.  -LB  --    LTG 3 Progress  New  -LB  --       Time Calculation    PT Goal Re-Cert Due Date  04/21/21  -LB  04/21/21  -LB      User Key  (r) = Recorded By, (t) = Taken By, (c) = Cosigned By    Initials Name Provider Type    Shira De Leon, PT Physical Therapist          PT Assessment/Plan     Row Name 01/21/21 1506          PT Assessment    Functional Limitations  Decreased safety during functional activities;Limitations in community activities;Performance in sport activities;Limitations in functional capacity and performance;Performance in work activities;Impaired gait;Impaired locomotion;Performance in leisure activities;Limitation in home management;Performance in self-care ADL  -LB     Impairments  Balance;Edema;Endurance;Gait;Impaired flexibility;Locomotion;Muscle strength;Pain;Range of motion;Sensation  -LB     Assessment Comments  Pt is 50 y.o. female referred to outpatient physical therapy for evaluation and treatment of  evolving  left knee pain that began insidiously one month ago and has not changed since onset.  Patient presents with L knee edema, pain lateral and superior to patella, dec tolerance to resisted movements, guarded ROM, gait deficits. PMHx consistent with no previous injury. 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 Pt demonstrates signs and symptoms  consistent with referring diagnosis.  Pt scored 54% disability on the KOS ADL. Pt is limited in their ability to participate in work duties, walking for exercise, household tasks. She will benefit from continued skilled PT services to address functional deficits. Thank you for this referral.  -LB     Please refer to paper survey for additional self-reported information  Yes  -LB     Rehab Potential  Good   -LB     Patient/caregiver participated in establishment of treatment plan and goals  Yes  -LB     Patient would benefit from skilled therapy intervention  Yes  -LB        PT Plan    PT Frequency  2x/week  -LB     Predicted Duration of Therapy Intervention (PT)  8-12 visits  -LB     PT Plan Comments  Assess tolerance to HEP, gait training, work without brace in clinic with L knee AROM/strengthening, hip strengthening, inflammation management education.  -LB       User Key  (r) = Recorded By, (t) = Taken By, (c) = Cosigned By    Initials Name Provider Type    Shira De Leon PT Physical Therapist            OP Exercises     Row Name 01/21/21 1300             Subjective Comments    Subjective Comments  I am just getting so frustrated with my knee. I don't want to have the scope but it is not getting any better.  -LB         Subjective Pain    Able to rate subjective pain?  yes  -LB      Pre-Treatment Pain Level  4  -LB         Total Minutes    57681 - PT Therapeutic Exercise Minutes  15  -LB         Exercise 1    Exercise Name 1  supine heel slide  -LB      Reps 1  10  -LB         Exercise 2    Exercise Name 2  supine QS  -LB      Reps 2  10  -LB      Time 2  5  -LB         Exercise 3    Exercise Name 3  90/90 HS stretch  -LB      Reps 3  3  -LB      Time 3  20  -LB         Exercise 4    Exercise Name 4  standing HR  -LB      Reps 4  10  -LB         Exercise 5    Exercise Name 5  standing HS curl  -LB      Reps 5  10  -LB        User Key  (r) = Recorded By, (t) = Taken By, (c) = Cosigned By    Initials Name Provider Type    Shira De Leon PT Physical Therapist                        Outcome Measure Options: Knee Outcome Score- ADL  Knee Outcome Score  Knee Outcome Score Comments: 54% disability       Time Calculation:     Start Time: 1230  Stop Time: 1315  Time Calculation (min): 45 min  Total Timed Code Minutes- PT: 15 minute(s)     Therapy Charges for Today     Code Description Service Date Service Provider  Modifiers Qty    44379656957 HC PT THER PROC EA 15 MIN 1/21/2021 Shira Bergman, PT GP 1    12927452866 HC PT EVAL LOW COMPLEXITY 2 1/21/2021 Shira Bergman, PT GP 1          PT G-Codes  Outcome Measure Options: Knee Outcome Score- ADL         Shira Bergman, PT  1/21/2021

## 2021-02-01 ENCOUNTER — HOSPITAL ENCOUNTER (OUTPATIENT)
Dept: PHYSICAL THERAPY | Facility: HOSPITAL | Age: 51
Setting detail: THERAPIES SERIES
Discharge: HOME | End: 2021-02-01

## 2021-02-01 DIAGNOSIS — M25.562 ACUTE PAIN OF LEFT KNEE: Primary | ICD-10-CM

## 2021-02-01 DIAGNOSIS — R26.2 DIFFICULTY WALKING: ICD-10-CM

## 2021-02-01 PROCEDURE — 97110 THERAPEUTIC EXERCISES: CPT

## 2021-02-01 PROCEDURE — 97112 NEUROMUSCULAR REEDUCATION: CPT

## 2021-02-01 NOTE — THERAPY TREATMENT NOTE
Outpatient Physical Therapy Ortho Treatment Note  [facility]     Patient Name: Nisha Beck  : 1970  MRN: [id number]  Today's Date: 2021      Visit Date: 2021    Visit Dx:    ICD-10-CM ICD-9-CM   1. Acute pain of left knee  M25.562 719.46   2. Difficulty walking  R26.2 719.7       There is no problem list on file for this patient.       Past Medical History:   Diagnosis Date   • GERD (gastroesophageal reflux disease)         Past Surgical History:   Procedure Laterality Date   • CYST REMOVAL      right side of neck   • ESSURE TUBAL LIGATION                         PT Assessment/Plan     Row Name 21 1700          PT Assessment    Assessment Comments  Nisah returns for first f/u visit with unchanged pain levels. Continues to demo significant fear avoidance beliefs and is hyper-focused on MRI results and if she will require knee scope throughout session. Discussed focusing more on improving strength and ROM rather than MRI results, but pt continues to bring it up. Pt does report she has tried to wear brace less per recommendations at [facility]. Added patella taping to address neuro re-ed and swelling, and continued low level, gentle strengthening.   -CA        PT Plan    PT Plan Comments  Consider knee, hip, ankle CARs next visit. Repeat patella taping as needed  -CA       User Key  (r) = Recorded By, (t) = Taken By, (c) = Cosigned By    Initials Name Provider Type    Willow Sanchez, PT Physical Therapist            OP Exercises     Row Name 21 1700             Subjective Comments    Subjective Comments  I'm worried I will have to have a scope  -CA         Subjective Pain    Able to rate subjective pain?  yes  -CA      Pre-Treatment Pain Level  3  -CA         Total Minutes    22010 - PT Therapeutic Exercise Minutes  26  -CA      20339 -  PT Neuromuscular Reeducation Minutes  12  -CA         Exercise 1    Exercise Name 1  supine heel slide  -CA      Reps 1  15  -CA         Exercise  "2    Exercise Name 2  supine QS  -CA      Reps 2  10  -CA      Time 2  5  -CA         Exercise 3    Exercise Name 3  90/90 HS stretch  -CA      Reps 3  3  -CA      Time 3  20  -CA         Exercise 4    Exercise Name 4  standing HR  -CA      Reps 4  10  -CA         Exercise 5    Exercise Name 5  standing HS curl  -CA      Reps 5  10  -CA         Exercise 6    Exercise Name 6  L patella \"horseshoe taping\"  -CA      Time 6  12 min  -CA      Additional Comments  measured lateral to medial along mid patella, split down middle to 1\" anchor piece and then frame patella superiorly and inferiorly  -CA         Exercise 7    Exercise Name 7  nu step  -CA      Time 7  5 min  -CA      Additional Comments  lvl 1  -CA         Exercise 8    Exercise Name 8  L knee flex on step  -CA      Cueing 8  Verbal;Demo  -CA      Reps 8  3  -CA      Time 8  20s  -CA      Additional Comments  pain free range  -CA         Exercise 9    Exercise Name 9  SLR  -CA      Cueing 9  Verbal  -CA      Reps 9  2  -CA      Additional Comments  attempted but pt crying out in pain so held  -CA         Exercise 10    Exercise Name 10  L hip abd in R S/L  -CA      Cueing 10  Verbal  -CA      Sets 10  2  -CA      Reps 10  10  -CA         Exercise 11    Exercise Name 11  L LAQ  -CA      Cueing 11  Verbal  -CA      Reps 11  10  -CA        User Key  (r) = Recorded By, (t) = Taken By, (c) = Cosigned By    Initials Name Provider Type    Willow Sanchez, PT Physical Therapist                       PT OP Goals     Row Name 02/01/21 1700          PT Short Term Goals    STG Date to Achieve  02/04/21  -CA     STG 1  Pt will demonstrate understanding and compliance with intial HEP.  -CA     STG 1 Progress  Ongoing  -CA     STG 2  Pt will report tolerance to not wearing brace at home without inc L knee symptoms.  -CA     STG 2 Progress  Ongoing  -CA     STG 2 Progress Comments  reports decreased freq of wearing bracd  -CA     STG 3  Pt will demonstrate AROM to 120 deg " of L knee with fluid movement and without hesitation.  -CA     STG 3 Progress  Ongoing  -CA        Long Term Goals    LTG Date to Achieve  02/20/21  -CA     LTG 1  Pt will demonstrate L knee extension strength 4+/5 or better.  -CA     LTG 1 Progress  Ongoing  -CA     LTG 2  Pt will demonstrate normalized gait pattern with equal B step length and normal gait speed.  -CA     LTG 2 Progress  Ongoing  -CA     LTG 3  Pt will deny pain at night and report <2/10 with work duties.  -CA     LTG 3 Progress  Ongoing  -CA       User Key  (r) = Recorded By, (t) = Taken By, (c) = Cosigned By    Initials Name Provider Type    Willow Sanchez, PT Physical Therapist                         Time Calculation:   Start Time: 1705  Stop Time: 1743  Time Calculation (min): 38 min  Total Timed Code Minutes- PT: 38 minute(s)  Therapy Charges for Today     Code Description Service Date Service Provider Modifiers Qty    46515118894  PT NEUROMUSC RE EDUCATION EA 15 MIN 2/1/2021 Willow Estrada, PT GP 1    [id number]  PT THER PROC EA 15 MIN 2/1/2021 Willow Estrada, PT GP 2                    Willow Estrada PT  2/1/2021

## 2021-02-04 ENCOUNTER — HOSPITAL ENCOUNTER (OUTPATIENT)
Dept: PHYSICAL THERAPY | Facility: HOSPITAL | Age: 51
Setting detail: THERAPIES SERIES
Discharge: HOME | End: 2021-02-04

## 2021-02-04 DIAGNOSIS — R26.2 DIFFICULTY WALKING: ICD-10-CM

## 2021-02-04 DIAGNOSIS — M25.562 ACUTE PAIN OF LEFT KNEE: Primary | ICD-10-CM

## 2021-02-04 PROCEDURE — 97110 THERAPEUTIC EXERCISES: CPT

## 2021-02-04 NOTE — THERAPY TREATMENT NOTE
Outpatient Physical Therapy Ortho Treatment Note  [facility]     Patient Name: Nisha Beck  : 1970  MRN: [id number]  Today's Date: 2021      Visit Date: 2021    Visit Dx:    ICD-10-CM ICD-9-CM   1. Acute pain of left knee  M25.562 719.46   2. Difficulty walking  R26.2 719.7       There is no problem list on file for this patient.       Past Medical History:   Diagnosis Date   • GERD (gastroesophageal reflux disease)         Past Surgical History:   Procedure Laterality Date   • CYST REMOVAL      right side of neck   • ESSURE TUBAL LIGATION                         PT Assessment/Plan     Row Name 21 1600          PT Assessment    Assessment Comments  Nisha returns today with no change in pain levels and reports worsened pain with k tape last visit. She continues to demonstrate fear avoidance beliefs with PT, and has made minimal to no progress with pain and symptoms since starting therapy. Has f/u with MD next week to determine next step.  -CA        PT Plan    PT Plan Comments  f/u re: MD appt, determine if going to schedule more visits or not  -CA       User Key  (r) = Recorded By, (t) = Taken By, (c) = Cosigned By    Initials Name Provider Type    Willow Sanchez, PT Physical Therapist            OP Exercises     Row Name 21 1600             Subjective Comments    Subjective Comments  The tape made my pain worse. See MD monday.  -CA         Subjective Pain    Able to rate subjective pain?  yes  -CA      Pre-Treatment Pain Level  2  -CA         Total Minutes    80037 - PT Therapeutic Exercise Minutes  38  -CA         Exercise 1    Exercise Name 1  supine heel slide  -CA      Reps 1  15  -CA         Exercise 2    Exercise Name 2  supine QS  -CA      Reps 2  10  -CA      Time 2  5  -CA         Exercise 3    Exercise Name 3  90/90 HS stretch  -CA      Reps 3  3  -CA      Time 3  20  -CA         Exercise 4    Exercise Name 4  standing HR  -CA      Reps 4  15  -CA          Exercise 5    Exercise Name 5  standing HS curl  -CA      Sets 5  2  -CA      Reps 5  10  -CA      Time 5  2#  -CA         Exercise 6    Exercise Name 6  L hip CARs  -CA      Cueing 6  Verbal  -CA      Reps 6  10 ea  -CA      Additional Comments  S/L in hip ext  -CA         Exercise 7    Exercise Name 7  recumbent bike  -CA      Time 7  5 min   -CA      Additional Comments  lvl 1  -CA         Exercise 8    Exercise Name 8  L knee flex on step  -CA      Cueing 8  Verbal;Demo  -CA      Reps 8  3  -CA      Time 8  20s  -CA         Exercise 9    Exercise Name 9  L knee CARs - seated in knee flex  -CA      Cueing 9  Verbal;Demo  -CA      Reps 9  15  -CA      Additional Comments  tibial ir/er  -CA         Exercise 11    Exercise Name 11  L LAQ  -CA      Cueing 11  Verbal  -CA      Sets 11  2  -CA      Reps 11  10  -CA      Time 11  2#  -CA         Exercise 12    Exercise Name 12  L knee posterior stabilization  -CA      Cueing 12  Verbal  -CA      Reps 12  10  -CA      Time 12  5 sec  -CA      Additional Comments  similar to bridge - pushing through heel and barely lifting bottom, will feel HS activation  -CA         Exercise 13    Exercise Name 13  HL hip abd  -CA      Cueing 13  Verbal  -CA      Sets 13  2  -CA      Reps 13  10  -CA      Time 13  RTB  -CA      Additional Comments  pt reports she is nervous to perform this exercise  -CA         Exercise 14    Exercise Name 14  standing hip abd  -CA      Cueing 14  Verbal  -CA      Reps 14  10 B  -CA      Time 14  RTB below knees  -CA        User Key  (r) = Recorded By, (t) = Taken By, (c) = Cosigned By    Initials Name Provider Type    Willow Sanchez, PT Physical Therapist                       PT OP Goals     Row Name 02/04/21 1700          PT Short Term Goals    STG Date to Achieve  02/04/21  -CA     STG 1  Pt will demonstrate understanding and compliance with intial HEP.  -CA     STG 1 Progress  Ongoing  -CA     STG 2  Pt will report tolerance to not wearing  brace at home without inc L knee symptoms.  -CA     STG 2 Progress  Ongoing  -CA     STG 3  Pt will demonstrate AROM to 120 deg of L knee with fluid movement and without hesitation.  -CA     STG 3 Progress  Ongoing  -CA        Long Term Goals    LTG Date to Achieve  02/20/21  -CA     LTG 1  Pt will demonstrate L knee extension strength 4+/5 or better.  -CA     LTG 1 Progress  Ongoing  -CA     LTG 2  Pt will demonstrate normalized gait pattern with equal B step length and normal gait speed.  -CA     LTG 2 Progress  Ongoing  -CA     LTG 3  Pt will deny pain at night and report <2/10 with work duties.  -CA     LTG 3 Progress  Ongoing  -CA       User Key  (r) = Recorded By, (t) = Taken By, (c) = Cosigned By    Initials Name Provider Type    Willow Sanchez, PT Physical Therapist                         Time Calculation:   Start Time: 1647  Stop Time: 1725  Time Calculation (min): 38 min  Total Timed Code Minutes- PT: 38 minute(s)  Therapy Charges for Today     Code Description Service Date Service Provider Modifiers Qty    31342711672 HC PT THER PROC EA 15 MIN 2/4/2021 Willow Estrada, PT GP 3                    Willow Estrada PT  2/4/2021

## 2021-02-08 ENCOUNTER — HOSPITAL ENCOUNTER (OUTPATIENT)
Dept: PHYSICAL THERAPY | Facility: HOSPITAL | Age: 51
Setting detail: THERAPIES SERIES
Discharge: HOME | End: 2021-02-08

## 2021-02-08 ENCOUNTER — OFFICE VISIT (OUTPATIENT)
Dept: ORTHOPEDIC SURGERY | Facility: CLINIC | Age: 51
End: 2021-02-08

## 2021-02-08 VITALS — BODY MASS INDEX: 26.06 KG/M2 | HEIGHT: 61 IN | TEMPERATURE: 98.2 F | WEIGHT: 138 LBS

## 2021-02-08 DIAGNOSIS — M22.2X2 PATELLOFEMORAL PAIN SYNDROME OF LEFT KNEE: Primary | ICD-10-CM

## 2021-02-08 DIAGNOSIS — R26.2 DIFFICULTY WALKING: ICD-10-CM

## 2021-02-08 DIAGNOSIS — M25.562 ACUTE PAIN OF LEFT KNEE: Primary | ICD-10-CM

## 2021-02-08 PROCEDURE — 97110 THERAPEUTIC EXERCISES: CPT

## 2021-02-08 PROCEDURE — 99212 OFFICE O/P EST SF 10 MIN: CPT | Performed by: ORTHOPAEDIC SURGERY

## 2021-02-08 RX ORDER — TIZANIDINE HYDROCHLORIDE 4 MG/1
4 CAPSULE, GELATIN COATED ORAL NIGHTLY PRN
COMMUNITY
Start: 2019-01-14 | End: 2021-08-19

## 2021-02-08 NOTE — PROGRESS NOTES
Left Knee Follow Up      Patient: Nisha Beck        YOB: 1970            Chief Complaints: Left knee pain      History of Present Illness: Patient is here follow left knee pain she had an MRI which shows mostly patellofemoral OA she does have a tiny surface fraying of the central part of the lateral meniscus I did inject her last visit she still having difficulty particular if she is up on her feet to the point where she has some days where she cannot do any work she is doing some physical therapy      Physical Exam: 51 y.o. female  General Appearance:    Alert, cooperative, in no acute distress                 There were no vitals filed for this visit.     Patient is alert and read ×3 no acute distress appears her above-listed at height weight and age.  Affect is normal respiratory rate is normal unlabored. Heart rate regular rate rhythm, sclera, dentition and hearing are normal for the purpose of this exam.      Ortho Exam   Physical exam of the left knee reveals no effusion, no erythema.  She does have pretty poor quad tone and girth the patient has no palpable tenderness along the medial joint line, no tenderness about the lateral joint line.  Patient does have crepitus with patellofemoral range of motion.  They also have subjective symptoms anteriorly during exam.  The patient has a negative bounce home, negative Rolando and a stable ligamentous exam.  Quad tone is reasonable and symmetric.  There are no overlying skin changes no lymphedema no lymphadenopathy.  There is good hip range of motion which is full symmetric and asymptomatic and a normal ankle exam.  Hamstrings and IT band are tight bilaterally.                Assessment/Plan:      Continued left knee pain I still think this is mostly patellofemoral with steroid J brace today but I think it is imperative that she get stronger quad and core still not convinced the fraying of the lateral meniscus is part of her current  symptomatology

## 2021-02-08 NOTE — THERAPY TREATMENT NOTE
Outpatient Physical Therapy Ortho Treatment Note  [facility]     Patient Name: Nisha Beck  : 1970  MRN: [id number]  Today's Date: 2021      Visit Date: 2021    Visit Dx:    ICD-10-CM ICD-9-CM   1. Acute pain of left knee  M25.562 719.46   2. Difficulty walking  R26.2 719.7       There is no problem list on file for this patient.       Past Medical History:   Diagnosis Date   • GERD (gastroesophageal reflux disease)         Past Surgical History:   Procedure Laterality Date   • CYST REMOVAL      right side of neck   • ESSURE TUBAL LIGATION                         PT Assessment/Plan     Row Name 21 1800          PT Assessment    Assessment Comments  Nisha returns after f/u with MD. Per pt, MD says there is a small tear in the knee but nothing that will require surgery. She continues to demo significant fear avoidance beliefs. Discussed trying not to focus on pain as much and fear of surgery, and pt verb good understanding. Continues to demo significant weakness in B hips.  -CA        PT Plan    PT Plan Comments  Cont to progress B hip strengthening, f/u on if brace is helping or not.  -CA       User Key  (r) = Recorded By, (t) = Taken By, (c) = Cosigned By    Initials Name Provider Type    Willow Sanchez, PT Physical Therapist            OP Exercises     Row Name 21 1700             Subjective Comments    Subjective Comments  Dr. Faulkner said I don't need surgery so that's good. My pain was really bad this weekend and I was worried about seeing her. I was scared and I was crying a lot because of my knee  -CA         Subjective Pain    Able to rate subjective pain?  yes  -CA      Pre-Treatment Pain Level  2  -CA         Total Minutes    86675 - PT Therapeutic Exercise Minutes  38  -CA         Exercise 1    Exercise Name 1  supine heel slide  -CA      Reps 1  15  -CA      Additional Comments  with strap  -CA         Exercise 2    Exercise Name 2  supine QS  -CA      Reps 2   10  -CA      Time 2  5  -CA         Exercise 3    Exercise Name 3  90/90 HS stretch  -CA      Reps 3  3  -CA      Time 3  20  -CA         Exercise 4    Exercise Name 4  standing HR  -CA      Reps 4  15  -CA         Exercise 5    Exercise Name 5  standing HS curl  -CA      Sets 5  2  -CA      Reps 5  10  -CA      Time 5  2#  -CA         Exercise 6    Exercise Name 6  side steps  -CA      Cueing 6  Verbal  -CA      Reps 6  3 laps  -CA      Time 6  RTB below knees  -CA         Exercise 7    Exercise Name 7  nu step  -CA      Time 7  5 min  -CA         Exercise 8    Exercise Name 8  L knee flex on step  -CA      Cueing 8  Verbal;Demo  -CA      Reps 8  3  -CA      Time 8  20s  -CA         Exercise 9    Exercise Name 9  L knee CARs - seated in knee flex  -CA      Cueing 9  Verbal;Demo  -CA      Reps 9  15  -CA      Additional Comments  tibial ir/er  -CA         Exercise 10    Exercise Name 10  L hip abd in R S/L  -CA      Cueing 10  Verbal  -CA      Sets 10  2  -CA      Reps 10  10  -CA      Time 10  2#  -CA         Exercise 11    Exercise Name 11  L LAQ  -CA      Cueing 11  Verbal  -CA      Sets 11  2  -CA      Reps 11  10  -CA      Time 11  2#  -CA         Exercise 12    Exercise Name 12  L knee posterior stabilization  -CA      Cueing 12  Verbal  -CA      Reps 12  10  -CA      Time 12  5 sec  -CA      Additional Comments  similar to bridge - pushing through heel and barely lifting bottom, will feel HS activation  -CA         Exercise 14    Exercise Name 14  standing hip abd  -CA      Cueing 14  Verbal  -CA      Reps 14  12 B  -CA      Time 14  RTB below knees  -CA         Exercise 15    Exercise Name 15  squat against swiss ball on wall  -CA      Cueing 15  Verbal  -CA      Reps 15  10  -CA        User Key  (r) = Recorded By, (t) = Taken By, (c) = Cosigned By    Initials Name Provider Type    CA Willow Estrada, PT Physical Therapist                       PT OP Goals     Row Name 02/08/21 1800          PT Short Term  Goals    STG Date to Achieve  02/04/21  -CA     STG 1  Pt will demonstrate understanding and compliance with intial HEP.  -CA     STG 1 Progress  Ongoing  -CA     STG 2  Pt will report tolerance to not wearing brace at home without inc L knee symptoms.  -CA     STG 2 Progress  Ongoing  -CA     STG 3  Pt will demonstrate AROM to 120 deg of L knee with fluid movement and without hesitation.  -CA     STG 3 Progress  Ongoing  -CA        Long Term Goals    LTG Date to Achieve  02/20/21  -CA     LTG 1  Pt will demonstrate L knee extension strength 4+/5 or better.  -CA     LTG 1 Progress  Ongoing  -CA     LTG 2  Pt will demonstrate normalized gait pattern with equal B step length and normal gait speed.  -CA     LTG 2 Progress  Ongoing  -CA     LTG 3  Pt will deny pain at night and report <2/10 with work duties.  -CA     LTG 3 Progress  Ongoing  -CA       User Key  (r) = Recorded By, (t) = Taken By, (c) = Cosigned By    Initials Name Provider Type    Willow Sanchez, INE[facility] Physical Therapist                         Time Calculation:   Start Time: 1700  Stop Time: 1738  Time Calculation (min): 38 min  Total Timed Code Minutes- PT: 38 minute(s)  Therapy Charges for Today     Code Description Service Date Service Provider Modifiers Qty    08541743797 HC PT THER PROC EA 15 MIN 2/8/2021 Willow Estrada, PT GP 3                    Willow Estrada PT  2/8/2021

## 2021-02-15 ENCOUNTER — HOSPITAL ENCOUNTER (OUTPATIENT)
Dept: PHYSICAL THERAPY | Facility: HOSPITAL | Age: 51
Setting detail: THERAPIES SERIES
Discharge: HOME | End: 2021-02-15

## 2021-02-15 DIAGNOSIS — R26.2 DIFFICULTY WALKING: ICD-10-CM

## 2021-02-15 DIAGNOSIS — M25.562 ACUTE PAIN OF LEFT KNEE: Primary | ICD-10-CM

## 2021-02-15 PROCEDURE — 97110 THERAPEUTIC EXERCISES: CPT

## 2021-02-15 NOTE — THERAPY TREATMENT NOTE
Outpatient Physical Therapy Ortho Treatment Note  [facility]     Patient Name: Nisha Beck  : 1970  MRN: [id number]  Today's Date: 2/15/2021      Visit Date: 02/15/2021    Visit Dx:    ICD-10-CM ICD-9-CM   1. Acute pain of left knee  M25.562 719.46   2. Difficulty walking  R26.2 719.7       There is no problem list on file for this patient.       Past Medical History:   Diagnosis Date   • GERD (gastroesophageal reflux disease)         Past Surgical History:   Procedure Laterality Date   • CYST REMOVAL      right side of neck   • ESSURE TUBAL LIGATION         PT Ortho     Row Name 02/15/21 0800       RLE Quick Girth (cm)    Mid patella  38 cm  -CA       LLE Quick Girth (cm)    Mid patella  38 cm  -CA      User Key  (r) = Recorded By, (t) = Taken By, (c) = Cosigned By    Initials Name Provider Type    Willow Sanchez, PT Physical Therapist                      PT Assessment/Plan     Row Name 02/15/21 0900          PT Assessment    Assessment Comments  Nisha does demo some improvedment in overall s/s today. Still worried about swelling, but thinks it has improved some. Mid patella girth equal bilaterally today. Progressed hip strengthening and update HEP.  -CA        PT Plan    PT Plan Comments  Progress note, cont to progress hip strengthening. After remaining visits may consider additional f/u in 2 weeks to see if pt cont to improve.  -CA       User Key  (r) = Recorded By, (t) = Taken By, (c) = Cosigned By    Initials Name Provider Type    Willow Sanchez, PT Physical Therapist            OP Exercises     Row Name 02/15/21 0800             Subjective Comments    Subjective Comments  Feeling a little better and the swelling has gone down some. It just feels tired this morning. The brace is helping.  -CA         Subjective Pain    Able to rate subjective pain?  yes  -CA      Pre-Treatment Pain Level  2  -CA         Total Minutes    50982 - PT Therapeutic Exercise Minutes  39  -CA          Exercise 1    Exercise Name 1  prone HS curl  -CA      Cueing 1  Verbal  -CA      Sets 1  2  -CA      Reps 1  10  -CA      Time 1  3#  -CA      Additional Comments  reports weight is challenging for her  -CA         Exercise 2    Exercise Name 2  B S/L clam  -CA      Cueing 2  Verbal  -CA      Sets 2  2  -CA      Reps 2  10  -CA      Time 2  RTB  -CA         Exercise 3    Exercise Name 3  B seated HS str  -CA      Cueing 3  Verbal  -CA      Reps 3  3  -CA      Time 3  20 s  -CA         Exercise 4    Exercise Name 4  standing HR  -CA      Reps 4  20  -CA         Exercise 5    Exercise Name 5  standing HS curl  -CA      Sets 5  2  -CA      Reps 5  10  -CA      Time 5  3#  -CA         Exercise 6    Exercise Name 6  side steps  -CA      Cueing 6  Verbal  -CA      Reps 6  3 laps  -CA      Time 6  RTB below knees  -CA         Exercise 7    Exercise Name 7  nu step  -CA      Time 7  5 min  -CA      Additional Comments  lvl 5  -CA         Exercise 8    Exercise Name 8  L knee flex on step  -CA      Cueing 8  Verbal;Demo  -CA      Reps 8  3  -CA      Time 8  20s  -CA         Exercise 9    Exercise Name 9  monster walk  -CA      Cueing 9  Verbal  -CA      Reps 9  2 laps fwd and bkwd  -CA      Time 9  RTB at knees  -CA         Exercise 10    Exercise Name 10  B hip abd S/L  -CA      Cueing 10  Verbal  -CA      Sets 10  2  -CA      Reps 10  10  -CA      Time 10  3#  -CA         Exercise 11    Exercise Name 11  L LAQ  -CA      Cueing 11  Verbal  -CA      Sets 11  2  -CA      Reps 11  10  -CA      Time 11  3#  -CA         Exercise 12    Exercise Name 12  DL bridge  -CA      Cueing 12  Verbal  -CA      Reps 12  2x10  -CA      Additional Comments  attempted SL bridge but pt unable to perform 2' to weakness  -CA         Exercise 13    Exercise Name 13  HL hip abd  -CA      Cueing 13  Verbal  -CA      Sets 13  2  -CA      Reps 13  10  -CA      Time 13  RTB  -CA         Exercise 14    Exercise Name 14  standing hip abd  -CA       Cueing 14  Verbal  -CA      Reps 14  15 B  -CA      Time 14  RTB below knees  -CA         Exercise 15    Exercise Name 15  squat against swiss ball on wall  -CA      Cueing 15  Verbal  -CA      Reps 15  12  -CA        User Key  (r) = Recorded By, (t) = Taken By, (c) = Cosigned By    Initials Name Provider Type    Willow Sanchez, INEZ Physical Therapist                       PT OP Goals     Row Name 02/15/21 0800          PT Short Term Goals    STG Date to Achieve  02/04/21  -CA     STG 1  Pt will demonstrate understanding and compliance with intial HEP.  -CA     STG 1 Progress  Met  -CA     STG 1 Progress Comments  performs initial HEP dailiy  -CA     STG 2  Pt will report tolerance to not wearing brace at home without inc L knee symptoms.  -CA     STG 2 Progress  Ongoing  -CA     STG 2 Progress Comments  still wearing knee sleeve  -CA     STG 3  Pt will demonstrate AROM to 120 deg of L knee with fluid movement and without hesitation.  -CA     STG 3 Progress  Ongoing  -CA        Long Term Goals    LTG Date to Achieve  02/20/21  -CA     LTG 1  Pt will demonstrate L knee extension strength 4+/5 or better.  -CA     LTG 1 Progress  Ongoing  -CA     LTG 2  Pt will demonstrate normalized gait pattern with equal B step length and normal gait speed.  -CA     LTG 2 Progress  Ongoing  -CA     LTG 3  Pt will deny pain at night and report <2/10 with work duties.  -CA     LTG 3 Progress  Ongoing  -CA       User Key  (r) = Recorded By, (t) = Taken By, (c) = Cosigned By    Initials Name Provider Type    Willow Sanchez PT Physical Therapist          Therapy Education  Education Details: access code: [email or web address]  Given: HEP  Program: Reinforced, Progressed  How Provided: Verbal, Demonstration, Written  Provided to: Patient  Level of Understanding: Teach back education performed, Verbalized, Demonstrated              Time Calculation:   Start Time: 0830  Stop Time: 0909  Time Calculation (min): 39 min  Total Timed Code Minutes-  PT: 39 minute(s)  Therapy Charges for Today     Code Description Service Date Service Provider Modifiers Qty    67767325868 HC PT THER PROC EA 15 MIN 2/15/2021 Willow Estrada, PT GP 3                    Willow Estrada, PT  2/15/2021

## 2021-02-18 ENCOUNTER — TELEPHONE (OUTPATIENT)
Dept: ORTHOPEDIC SURGERY | Facility: CLINIC | Age: 51
End: 2021-02-18

## 2021-02-18 ENCOUNTER — APPOINTMENT (OUTPATIENT)
Dept: PHYSICAL THERAPY | Facility: HOSPITAL | Age: 51
End: 2021-02-18

## 2021-02-18 NOTE — TELEPHONE ENCOUNTER
If your symptoms persist after doing a little more therapy and getting stronger we could consider arthroscopy I would really like it to see you stronger prior to doing that and if you still have symptoms despite that we can consider scoping this

## 2021-02-18 NOTE — TELEPHONE ENCOUNTER
"----- Message from Nisha eBck sent at 2/18/2021 10:51 AM EST -----  Regarding: RE: Visit Follow-Up Question  Contact: [phone]  Please tell Dr. Faulkner thank you.  I will work on  PT as much as I can, if my symptoms are not getting better will update by March.  I am working on the exercises even at home when I am off.  I am trying to be strong and work even with pain and swelling.  If things don't work out with PT.. will update the soonest.  God bless.      ----- Message -----  From: LARA BUTTS  Sent: 2/18/21, 10:41  To: Nisha Beck  Subject: RE: Visit Follow-Up Question    Dr.George Arlene said \" If your symptoms persist after doing a little more therapy and getting stronger we could consider arthroscopy I would really like it to see you stronger prior to doing that and if you still have symptoms despite that we can consider scoping this\"  Thank you, Jessica      ----- Message -----       From:Nisha Beck       Sent:2/18/2021 10:17 AM EST         To:Mildred Faulkner MD    Subject:Visit Follow-Up Question    Zach Falukner     Hope all is well.  I wss not able to ask about when i see you again for ff up? And how long will i have to do PT to see improvements or relief, my knee feels the same, the side with the tear still swells and hurts. My hip on my other side feels sore already because i know i am putting my weight there when i am walking.  I just wanted to feel better again. Just wanted to work with less pain. The brace hurts when i wear it cause it is making that side of my knee swell more up to my thigh most blane after working 12 hr shift.  I wear a knee sleeve instead for stability.  Thanks again.    Respectfully,  Nisha"

## 2021-02-18 NOTE — TELEPHONE ENCOUNTER
----- Message from Nisha Beck sent at 2/18/2021 10:17 AM EST -----  Regarding: Visit Follow-Up Question  Contact: [phone]  Zach Faulkner     Hope all is well.  I wss not able to ask about when i see you again for ff up? And how long will i have to do PT to see improvements or relief, my knee feels the same, the side with the tear still swells and hurts. My hip on my other side feels sore already because i know i am putting my weight there when i am walking.  I just wanted to feel better again. Just wanted to work with less pain. The brace hurts when i wear it cause it is making that side of my knee swell more up to my thigh most blane after working 12 hr shift.  I wear a knee sleeve instead for stability.  Thanks again.    Respectfully,  Nisha

## 2021-02-22 ENCOUNTER — HOSPITAL ENCOUNTER (OUTPATIENT)
Dept: PHYSICAL THERAPY | Facility: HOSPITAL | Age: 51
Setting detail: THERAPIES SERIES
Discharge: HOME | End: 2021-02-22

## 2021-02-22 DIAGNOSIS — R26.2 DIFFICULTY WALKING: ICD-10-CM

## 2021-02-22 DIAGNOSIS — M25.562 ACUTE PAIN OF LEFT KNEE: Primary | ICD-10-CM

## 2021-02-22 PROCEDURE — 97110 THERAPEUTIC EXERCISES: CPT

## 2021-02-22 NOTE — THERAPY PROGRESS REPORT/RE-CERT
Outpatient Physical Therapy Ortho Treatment Note  [facility]     Patient Name: Nisha Beck  : 1970  MRN: [id number]  Today's Date: 2021      Visit Date: 2021    Visit Dx:    ICD-10-CM ICD-9-CM   1. Acute pain of left knee  M25.562 719.46   2. Difficulty walking  R26.2 719.7       There is no problem list on file for this patient.       Past Medical History:   Diagnosis Date   • GERD (gastroesophageal reflux disease)         Past Surgical History:   Procedure Laterality Date   • CYST REMOVAL      right side of neck   • ESSURE TUBAL LIGATION         PT Ortho     Row Name 21 1600       Myotomal Screen- Lower Quarter Clearing    Knee extension (L3)  Left:;5 (Normal)  -CA    Knee flexion (S2)  Left:;4+ (Good +)  -CA       General ROM    GENERAL ROM COMMENTS  L knee 0-135 deg  -CA       MMT (Manual Muscle Testing)    Lt Lower Ext  Lt Hip ABduction  -CA       MMT Left Lower Ext    Lt Hip ABduction MMT, Gross Movement  (3/5) fair  -CA      User Key  (r) = Recorded By, (t) = Taken By, (c) = Cosigned By    Initials Name Provider Type    Willow Sanchez, PT Physical Therapist                      PT Assessment/Plan     Row Name 21 1600          PT Assessment    Assessment Comments  Nisha Beck has been seen for 6 physical therapy sessions for L knee pain.  Treatment has included therapeutic exercise, manual therapy, therapeutic activity, neuro-muscular retraining  and patient education with home exercise program . Progress to physical therapy goals is slow. She has met 2/3 STG and 2/4 LTG (one new). She continues to be limited by B hip weakness and fear avoidance beliefs/hyperaware of pain.  She will benefit from continued skilled physical therapy to address remaining impairments and functional limitations.   -CA        PT Plan    PT Plan Comments  Cont hip strengthening. Consider SL bridge; hip abd plank  -CA       User Key  (r) = Recorded By, (t) = Taken By, (c) = Cosigned By     Initials Name Provider Type    CA Willow Estrada, PT Physical Therapist            OP Exercises     Row Name 02/22/21 1500             Subjective Comments    Subjective Comments  I don't think I'm getting in better, I try to do my exercises at home but it still feels swollen and I'm always worried I'm going to hurt it more.  -CA         Subjective Pain    Able to rate subjective pain?  yes  -CA      Pre-Treatment Pain Level  2  -CA         Total Minutes    47631 - PT Therapeutic Exercise Minutes  38  -CA         Exercise 3    Exercise Name 3  B standing Hs str  -CA      Cueing 3  Verbal  -CA      Reps 3  3  -CA      Time 3   20s  -CA         Exercise 4    Exercise Name 4  standing HR  -CA      Reps 4  20  -CA         Exercise 5    Exercise Name 5  lateral heel tap  -CA      Cueing 5  Verbal  -CA      Sets 5  2  -CA      Reps 5  10  -CA      Additional Comments  L LE on airex  -CA         Exercise 6    Exercise Name 6  side steps  -CA      Cueing 6  Verbal  -CA      Reps 6  3 laps  -CA      Time 6  RTB below knees  -CA         Exercise 7    Exercise Name 7  recumbent bike  -CA      Cueing 7  Verbal  -CA      Time 7  5 min   -CA      Additional Comments  lvl 1  -CA         Exercise 8    Exercise Name 8  L knee flex on step  -CA      Cueing 8  Verbal;Demo  -CA      Reps 8  3  -CA      Time 8  20s  -CA         Exercise 9    Exercise Name 9  monster walk  -CA      Cueing 9  Verbal  -CA      Reps 9  3 laps fwd and bkwd  -CA      Time 9  RTB at knees  -CA         Exercise 11    Exercise Name 11  L SLS on airex  -CA      Cueing 11  Verbal  -CA      Reps 11  4  -CA      Time 11  10 s  -CA         Exercise 13    Exercise Name 13  standing clam B  -CA      Cueing 13  Verbal  -CA      Reps 13  10  -CA      Additional Comments  standing with neutral hip and knee flexed to 90 deg and food supported on wall to maintain hip ext engagement  -CA         Exercise 14    Exercise Name 14  standing hip abd  -CA      Cueing 14  Verbal   "-CA      Sets 14  2  -CA      Reps 14  10  -CA      Time 14  RTB below knees  -CA         Exercise 15    Exercise Name 15  wall squat/slide  -CA      Cueing 15  Verbal  -CA      Reps 15  10  -CA      Time 15  5 s  -CA      Additional Comments  no ball  -CA         Exercise 16    Exercise Name 16  L LE fwd step up  -CA      Cueing 16  Verbal  -CA      Sets 16  2  -CA      Reps 16  10  -CA      Time 16  4\"  -CA        User Key  (r) = Recorded By, (t) = Taken By, (c) = Cosigned By    Initials Name Provider Type    Willow Sanchez, PT Physical Therapist                       PT OP Goals     Row Name 02/22/21 1500          PT Short Term Goals    STG Date to Achieve  02/04/21  -CA     STG 1  Pt will demonstrate understanding and compliance with intial HEP.  -CA     STG 1 Progress  Met  -CA     STG 2  Pt will report tolerance to not wearing brace at home without inc L knee symptoms.  -CA     STG 2 Progress  Partially Met;Ongoing  -CA     STG 3  Pt will demonstrate AROM to 120 deg of L knee with fluid movement and without hesitation.  -CA     STG 3 Progress  Met  -CA     STG 3 Progress Comments  0-135 deg  -CA        Long Term Goals    LTG Date to Achieve  02/20/21  -CA     LTG 1  Pt will demonstrate L knee extension strength 4+/5 or better.  -CA     LTG 1 Progress  Met  -CA     LTG 1 Progress Comments  L knee ext 5/5  -CA     LTG 2  Pt will demonstrate normalized gait pattern with equal B step length and normal gait speed.  -CA     LTG 2 Progress  Met  -CA     LTG 3  Pt will deny pain at night and report <2/10 with work duties.  -CA     LTG 3 Progress  Ongoing  -CA     LTG 3 Progress Comments  still has 3-4/10 pain consistently  -CA     LTG 4  Pt will demo at least 4-/5 L hip abd strength to decrease knee pain.  -CA     LTG 4 Progress  New  -CA       User Key  (r) = Recorded By, (t) = Taken By, (c) = Cosigned By    Initials Name Provider Type    Willow Sanchez, PT Physical Therapist                         Time " Calculation:   Start Time: 1530  Stop Time: 1614  Time Calculation (min): 44 min  Total Timed Code Minutes- PT: 38 minute(s)  Therapy Charges for Today     Code Description Service Date Service Provider Modifiers Qty    84086306637 HC PT THER PROC EA 15 MIN 2/22/2021 Willow Estrada, PT GP 3                    Willow Estrada, PT  2/22/2021

## 2021-02-25 ENCOUNTER — HOSPITAL ENCOUNTER (OUTPATIENT)
Dept: PHYSICAL THERAPY | Facility: HOSPITAL | Age: 51
Setting detail: THERAPIES SERIES
Discharge: HOME | End: 2021-02-25

## 2021-02-25 DIAGNOSIS — R26.2 DIFFICULTY WALKING: ICD-10-CM

## 2021-02-25 DIAGNOSIS — M25.562 ACUTE PAIN OF LEFT KNEE: Primary | ICD-10-CM

## 2021-02-25 PROCEDURE — 97110 THERAPEUTIC EXERCISES: CPT

## 2021-02-25 NOTE — THERAPY TREATMENT NOTE
Outpatient Physical Therapy Ortho Treatment Note  [facility]     Patient Name: Nisha Beck  : 1970  MRN: [id number]  Today's Date: 2021      Visit Date: 2021    Visit Dx:    ICD-10-CM ICD-9-CM   1. Acute pain of left knee  M25.562 719.46   2. Difficulty walking  R26.2 719.7       There is no problem list on file for this patient.       Past Medical History:   Diagnosis Date   • GERD (gastroesophageal reflux disease)         Past Surgical History:   Procedure Laterality Date   • CYST REMOVAL      right side of neck   • ESSURE TUBAL LIGATION                         PT Assessment/Plan     Row Name 21 1500          PT Assessment    Assessment Comments  Nisha continues to presents with high levels of knee pain, unchanged since start of therapy. She also continues to present with significant hip weakness, as she fatigues quickly with hip focused exercises with low resistance. I discussed HEP balance with her and trying to perform HEP every other day to allow muscles to have a recovery day in between hip strengthening exercises, as she has been performing daily and more reps than assign, and reporting associated increased pain. Pt verbalized understanding. Plan to cont therapy for remaining scheduled visits, and if s/s have not improved DC to indep HEP and return to MD.  -CA        PT Plan    PT Plan Comments  Anticipate return to MD at end of scheduled visits if pain not improved.  -CA       User Key  (r) = Recorded By, (t) = Taken By, (c) = Cosigned By    Initials Name Provider Type    Willow Sanchez, PT Physical Therapist            OP Exercises     Row Name 21 1500             Subjective Comments    Subjective Comments  I was up all night crying. I don't think I'm going to get better. It just hurts all the time.  -CA         Subjective Pain    Able to rate subjective pain?  yes  -CA      Pre-Treatment Pain Level  6  -CA         Total Minutes    83556 - PT Therapeutic  "Exercise Minutes  40  -CA         Exercise 1    Exercise Name 1  standing B hip ext  -CA      Cueing 1  Verbal  -CA      Sets 1  2  -CA      Reps 1  10  -CA      Time 1  RTB at knees  -CA         Exercise 3    Exercise Name 3  B standing Hs str  -CA      Cueing 3  Verbal  -CA      Reps 3  3  -CA      Time 3   20s  -CA         Exercise 4    Exercise Name 4  HR off of step  -CA      Cueing 4  Verbal  -CA      Reps 4  15  -CA         Exercise 5    Exercise Name 5  lateral heel tap  -CA      Cueing 5  Verbal  -CA      Sets 5  2  -CA      Reps 5  10  -CA      Additional Comments  L LE on airex; cues for \"sit back in chair\"  -CA         Exercise 6    Exercise Name 6  side steps  -CA      Cueing 6  Verbal  -CA      Reps 6  3 laps  -CA      Time 6  RTB below knees  -CA         Exercise 7    Exercise Name 7  recumbent bike  -CA      Cueing 7  Verbal  -CA      Time 7  5 min   -CA         Exercise 8    Exercise Name 8  L knee flex on step  -CA      Cueing 8  Verbal;Demo  -CA      Reps 8  3  -CA      Time 8  20s  -CA         Exercise 9    Exercise Name 9  monster walk  -CA      Cueing 9  Verbal  -CA      Reps 9  3 laps fwd and bkwd  -CA      Time 9  RTB at knees  -CA         Exercise 10    Exercise Name 10  prone hip ext  -CA      Cueing 10  Verbal  -CA      Sets 10  2 B  -CA      Reps 10  10  -CA      Additional Comments  fatigue at end  -CA         Exercise 11    Exercise Name 11  L SLS on airex  -CA      Cueing 11  Verbal  -CA      Reps 11  4  -CA      Time 11  15 s  -CA         Exercise 12    Exercise Name 12  SL bridge with contra limb figure 4  -CA      Cueing 12  Verbal  -CA      Reps 12  2x10  -CA         Exercise 13    Exercise Name 13  standing clam B  -CA      Cueing 13  Verbal  -CA      Sets 13  2  -CA      Reps 13  10  -CA      Additional Comments  standing with neutral hip and knee flexed to 90 deg and food supported on wall to maintain hip ext engagement  -CA         Exercise 14    Exercise Name 14  standing hip " "abd  -CA      Cueing 14  Verbal  -CA      Sets 14  2  -CA      Reps 14  10  -CA      Time 14  RTB below knees  -CA         Exercise 15    Exercise Name 15  wall squat/slide  -CA      Cueing 15  Verbal  -CA      Reps 15  10  -CA      Time 15  5 s  -CA      Additional Comments  no ball  -CA         Exercise 16    Exercise Name 16  L LE fwd step up  -CA      Cueing 16  Verbal  -CA      Sets 16  2  -CA      Reps 16  10  -CA      Time 16  6\"  -CA        User Key  (r) = Recorded By, (t) = Taken By, (c) = Cosigned By    Initials Name Provider Type    Willow Sanchez, PT Physical Therapist                       PT OP Goals     Row Name 02/25/21 1500          PT Short Term Goals    STG Date to Achieve  02/04/21  -CA     STG 1  Pt will demonstrate understanding and compliance with intial HEP.  -CA     STG 1 Progress  Met  -CA     STG 2  Pt will report tolerance to not wearing brace at home without inc L knee symptoms.  -CA     STG 2 Progress  Partially Met;Ongoing  -CA     STG 3  Pt will demonstrate AROM to 120 deg of L knee with fluid movement and without hesitation.  -CA     STG 3 Progress  Met  -CA        Long Term Goals    LTG Date to Achieve  02/20/21  -CA     LTG 1  Pt will demonstrate L knee extension strength 4+/5 or better.  -CA     LTG 1 Progress  Met  -CA     LTG 2  Pt will demonstrate normalized gait pattern with equal B step length and normal gait speed.  -CA     LTG 2 Progress  Met  -CA     LTG 3  Pt will deny pain at night and report <2/10 with work duties.  -CA     LTG 3 Progress  Ongoing  -CA     LTG 4  Pt will demo at least 4-/5 L hip abd strength to decrease knee pain.  -CA     LTG 4 Progress  New  -CA       User Key  (r) = Recorded By, (t) = Taken By, (c) = Cosigned By    Initials Name Provider Type    Willow Sanchez, PT Physical Therapist                         Time Calculation:   Start Time: 1515  Stop Time: 1555  Time Calculation (min): 40 min  Total Timed Code Minutes- PT: 40 " minute(s)  Therapy Charges for Today     Code Description Service Date Service Provider Modifiers Qty    40855137054 HC PT THER PROC EA 15 MIN 2/25/2021 Willow Estrada, PT GP 3                    Willow Estrada, PT  2/25/2021

## 2021-02-26 ENCOUNTER — TELEPHONE (OUTPATIENT)
Dept: ORTHOPEDIC SURGERY | Facility: CLINIC | Age: 51
End: 2021-02-26

## 2021-02-26 NOTE — TELEPHONE ENCOUNTER
LVM for pt stating ISAAC did want to follow up with her and she does have an opening for 3/8/2021 at 210 at our [facility] location if interested. Instructed pt to give us as call back so we can get this scheduled.

## 2021-02-26 NOTE — TELEPHONE ENCOUNTER
----- Message from Nisha Beck sent at 2/26/2021  9:58 AM EST -----  Regarding: Visit Follow-Up Question  Contact: [phone]  Good am Dr. Faulkner, hope all is well.  My PT will be now weekly till March 2nd week.  Willow PT really had been patient with my treatment, I just told her that the past days had been tough, the pain at the side where the tear is so bad, i was crying all day but I still did all my home exercises despite.  Can I call for ff up with you soon?     Thanks again for all the help.  Nisha

## 2021-03-03 ENCOUNTER — HOSPITAL ENCOUNTER (OUTPATIENT)
Dept: PHYSICAL THERAPY | Facility: HOSPITAL | Age: 51
Setting detail: THERAPIES SERIES
Discharge: HOME | End: 2021-03-03

## 2021-03-03 DIAGNOSIS — R26.2 DIFFICULTY WALKING: ICD-10-CM

## 2021-03-03 DIAGNOSIS — M25.562 ACUTE PAIN OF LEFT KNEE: Primary | ICD-10-CM

## 2021-03-03 PROCEDURE — 97110 THERAPEUTIC EXERCISES: CPT

## 2021-03-03 NOTE — THERAPY DISCHARGE NOTE
Outpatient Physical Therapy Ortho Treatment Note/Discharge Summary  [facility]     Patient Name: Nisha Beck  : 1970  MRN: [id number]  Today's Date: 3/3/2021      Visit Date: 2021    Visit Dx:    ICD-10-CM ICD-9-CM   1. Acute pain of left knee  M25.562 719.46   2. Difficulty walking  R26.2 719.7       There is no problem list on file for this patient.       Past Medical History:   Diagnosis Date   • GERD (gastroesophageal reflux disease)         Past Surgical History:   Procedure Laterality Date   • CYST REMOVAL      right side of neck   • ESSURE TUBAL LIGATION                         PT Assessment/Plan     Row Name 21 1644          PT Assessment    Assessment Comments  Pt has participated in 8 skilled PT sessions to address L knee pain. She has made good improvement in BLE strengthening, L knee AROM is WFL, and she is compliant with advanced HEP to address LE/hip strengthening. She has made progress in all areas except for pain and continues to report high pain levels that does not improve with modalities, rest, or exercise. She has reached plateau in improvement with skilled PT services. She returns to MD next week. We plan to hold further skilled PT until she sees MD and determines next steps.  -LB        PT Plan    PT Plan Comments  RTMD and follow up if needed  -LB       User Key  (r) = Recorded By, (t) = Taken By, (c) = Cosigned By    Initials Name Provider Type    LB Shira Bergman, PT Physical Therapist              OP Exercises     Row Name 21 1500             Subjective Comments    Subjective Comments  I am still in tons of pain. I am doing my exercises all day long on days I don't work. It is getting worse. Now my R hip is hurting. I just know I am going to have to have surgeries.  -LB         Subjective Pain    Able to rate subjective pain?  yes  -LB      Pre-Treatment Pain Level  6  -LB         Total Minutes    84925 - PT Therapeutic Exercise Minutes  40  -LB          "Exercise 1    Exercise Name 1  standing B hip ext  -LB      Cueing 1  Verbal  -LB      Sets 1  2  -LB      Reps 1  10  -LB      Time 1  RTB at knees  -LB         Exercise 3    Exercise Name 3  B standing Hs str  -LB      Cueing 3  Verbal  -LB      Reps 3  3  -LB      Time 3   20s  -LB         Exercise 4    Exercise Name 4  HR off of step  -LB      Cueing 4  Verbal  -LB      Reps 4  15  -LB         Exercise 5    Exercise Name 5  lateral heel tap  -LB      Cueing 5  Verbal  -LB      Sets 5  2  -LB      Reps 5  10  -LB      Additional Comments  L LE on airex; cues for \"sit back in chair\"  -LB         Exercise 6    Exercise Name 6  side steps  -LB      Cueing 6  Verbal  -LB      Reps 6  3 laps  -LB      Time 6  RTB below knees  -LB         Exercise 7    Exercise Name 7  recumbent bike  -LB      Cueing 7  Verbal  -LB      Time 7  5 min   -LB         Exercise 8    Exercise Name 8  L knee flex on step  -LB      Cueing 8  Verbal;Demo  -LB      Reps 8  3  -LB      Time 8  20s  -LB         Exercise 9    Exercise Name 9  monster walk  -LB      Cueing 9  Verbal  -LB      Reps 9  3 laps fwd and bkwd  -LB      Time 9  RTB at knees  -LB         Exercise 10    Exercise Name 10  prone hip ext  -LB      Cueing 10  Verbal  -LB      Sets 10  2 B  -LB      Reps 10  10  -LB         Exercise 11    Exercise Name 11  L SLS on airex  -LB      Cueing 11  Verbal  -LB      Reps 11  4  -LB      Time 11  15 s  -LB         Exercise 12    Exercise Name 12  SL bridge with contra limb figure 4  -LB      Cueing 12  Verbal  -LB      Reps 12  2x10  -LB         Exercise 13    Exercise Name 13  standing clam B  -LB      Cueing 13  Verbal  -LB      Sets 13  2  -LB      Reps 13  10  -LB      Additional Comments  standing with neutral hip and knee flexed to 90 deg and food supported on wall to maintain hip ext engagement  -LB         Exercise 14    Exercise Name 14  standing hip abd  -LB      Cueing 14  Verbal  -LB      Sets 14  2  -LB      Reps 14  10  -LB  " "    Time 14  RTB below knees  -LB         Exercise 15    Exercise Name 15  wall squat/slide  -LB      Cueing 15  Verbal  -LB      Reps 15  10  -LB      Time 15  5 s  -LB      Additional Comments  no ball  -LB         Exercise 16    Exercise Name 16  L LE fwd step up  -LB      Cueing 16  Verbal  -LB      Sets 16  2  -LB      Reps 16  10  -LB      Time 16  6\"  -LB        User Key  (r) = Recorded By, (t) = Taken By, (c) = Cosigned By    Initials Name Provider Type    LB Shira Bergman PT Physical Therapist                         PT OP Goals     Row Name 03/03/21 1600          PT Short Term Goals    STG Date to Achieve  02/04/21  -LB     STG 1  Pt will demonstrate understanding and compliance with intial HEP.  -LB     STG 1 Progress  Met  -LB     STG 2  Pt will report tolerance to not wearing brace at home without inc L knee symptoms.  -LB     STG 2 Progress  Not Met  -LB     STG 2 Progress Comments  Pt wearing brace at home intermittently.  -LB     STG 3  Pt will demonstrate AROM to 120 deg of L knee with fluid movement and without hesitation.  -LB     STG 3 Progress  Met  -LB        Long Term Goals    LTG Date to Achieve  02/20/21  -LB     LTG 1  Pt will demonstrate L knee extension strength 4+/5 or better.  -LB     LTG 1 Progress  Met  -LB     LTG 2  Pt will demonstrate normalized gait pattern with equal B step length and normal gait speed.  -LB     LTG 2 Progress  Met  -LB     LTG 3  Pt will deny pain at night and report <2/10 with work duties.  -LB     LTG 3 Progress  Not Met  -LB     LTG 3 Progress Comments  6/10  -LB     LTG 4  Pt will demo at least 4-/5 L hip abd strength to decrease knee pain.  -LB     LTG 4 Progress  Met  -LB     LTG 4 Progress Comments  4-/5 B hip abduction strength  -LB       User Key  (r) = Recorded By, (t) = Taken By, (c) = Cosigned By    Initials Name Provider Type    Shira De Leon PT Physical Therapist          Therapy Education  Education Details: finalized HEP, instructed pt to " hold exercises until she RTMD; ok to continue strengthening if surgery becomes the option until surgery but to hold after surgery until return to PT  Given: Symptoms/condition management, Pain management  Program: Reinforced  How Provided: Demonstration, Verbal  Provided to: Patient  Level of Understanding: Teach back education performed, Verbalized, Demonstrated              Time Calculation:   Start Time: 1515  Stop Time: 1600  Time Calculation (min): 45 min  Total Timed Code Minutes- PT: 42 minute(s)  Therapy Charges for Today     Code Description Service Date Service Provider Modifiers Qty    43143903287 HC PT THER PROC EA 15 MIN 3/3/2021 Shira Bergman, PT GP 3                OP PT Discharge Summary  Date of Discharge: 03/03/21  Reason for Discharge: Independent, Lack of progress  Outcomes Achieved: Patient able to partially acheive established goals  Discharge Destination: Home with home program  Discharge Instructions/Additional Comments: see MD; see assessment      Shira Bergman, PT  3/3/2021

## 2021-03-08 ENCOUNTER — OFFICE VISIT (OUTPATIENT)
Dept: ORTHOPEDIC SURGERY | Facility: CLINIC | Age: 51
End: 2021-03-08

## 2021-03-08 ENCOUNTER — APPOINTMENT (OUTPATIENT)
Dept: PHYSICAL THERAPY | Facility: HOSPITAL | Age: 51
End: 2021-03-08

## 2021-03-08 VITALS — TEMPERATURE: 98.2 F | HEIGHT: 61 IN | WEIGHT: 139 LBS | BODY MASS INDEX: 26.24 KG/M2

## 2021-03-08 DIAGNOSIS — G89.29 CHRONIC PAIN OF LEFT KNEE: Primary | ICD-10-CM

## 2021-03-08 DIAGNOSIS — M25.562 CHRONIC PAIN OF LEFT KNEE: Primary | ICD-10-CM

## 2021-03-08 PROCEDURE — 99213 OFFICE O/P EST LOW 20 MIN: CPT | Performed by: ORTHOPAEDIC SURGERY

## 2021-03-08 RX ORDER — MELOXICAM 5 MG/1
CAPSULE ORAL
COMMUNITY
End: 2021-03-16

## 2021-03-08 RX ORDER — CEFAZOLIN SODIUM 2 G/100ML
2 INJECTION, SOLUTION INTRAVENOUS ONCE
Status: CANCELLED | OUTPATIENT
Start: 2021-03-17 | End: 2021-03-08

## 2021-03-08 NOTE — PROGRESS NOTES
Patient: Nisha Beck  YOB: 1970  Date of Service: 3/8/2021    Chief Complaints: Left knee pain    Subjective:    History of Present Illness: Pt is seen in the office today with complaints of left knee pain she has had this persistent anterior and lateral knee pain is failed conservative management she is been unable to work certain days and is pretty miserable.  She wishes to proceed the next step which would be arthroscopy.          Allergies:   Allergies   Allergen Reactions   • Celebrex [Celecoxib] Anaphylaxis       Medications:   Home Medications:  Current Outpatient Medications on File Prior to Visit   Medication Sig   • Ascorbic Acid (VITAMIN C PO) Take  by mouth.   • Cholecalciferol (VITAMIN D3 PO) Take  by mouth.   • Collagen-Boron-Hyaluronic Acid 10-5-3.3 MG tablet Take  by mouth.   • diclofenac (VOLTAREN) 75 MG EC tablet Take 1 tablet by mouth 2 (Two) Times a Day As Needed (knee pain).   • diphenhydrAMINE-acetaminophen (TYLENOL PM)  MG tablet per tablet Take 1 tablet by mouth Every Night.   • ELDERBERRY PO Take  by mouth.   • famotidine (PEPCID) 20 MG tablet Take 20 mg by mouth 2 (Two) Times a Day.   • Multiple Vitamins-Minerals (ZINC PO) Take 50 mg by mouth.   • temazepam (RESTORIL) 30 MG capsule Take 30 mg by mouth every night at bedtime.   • TiZANidine (Zanaflex) 4 MG capsule    • TURMERIC PO    • VITAMIN E PO Take  by mouth.     No current facility-administered medications on file prior to visit.     Current Medications:  Scheduled Meds:  Continuous Infusions:No current facility-administered medications for this visit.    PRN Meds:.    I have reviewed the patient's medical history in detail and updated the computerized patient record.  Review and summarization of old records include:    Past Medical History:   Diagnosis Date   • GERD (gastroesophageal reflux disease)         Past Surgical History:   Procedure Laterality Date   • CYST REMOVAL      right side of neck   • ESSURE  TUBAL LIGATION          Social History     Occupational History   • Not on file   Tobacco Use   • Smoking status: Former Smoker   • Smokeless tobacco: Never Used   Vaping Use   • Vaping Use: Never used   Substance and Sexual Activity   • Alcohol use: Defer   • Drug use: Defer   • Sexual activity: Defer      Social History     Social History Narrative   • Not on file        Family History   Problem Relation Age of Onset   • Stroke Mother    • Hypertension Mother    • Other Mother         GI bleed   • Heart disease Father    • Liver disease Father    • Diabetes Father        ROS: 14 point review of systems was performed and was negative except for documented findings in HPI and today's encounter.     Allergies:   Allergies   Allergen Reactions   • Celebrex [Celecoxib] Anaphylaxis     Constitutional:  Denies fever, shaking or chills   Eyes:  Denies change in visual acuity   HENT:  Denies nasal congestion or sore throat   Respiratory:  Denies cough or shortness of breath   Cardiovascular:  Denies chest pain or severe LE edema   GI:  Denies abdominal pain, nausea, vomiting, bloody stools or diarrhea   Musculoskeletal:  Numbness, tingling, or loss of motor function only as noted above in history of present illness.  : Denies painful urination or hematuria  Integument:  Denies rash, lesion or ulceration   Neurologic:  Denies headache or focal weakness  Endocrine:  Denies lymphadenopathy  Psych:  Denies confusion or change in mental status   Hem:  Denies active bleeding      Physical Exam: 51 y.o. female  Wt Readings from Last 3 Encounters:   02/08/21 62.6 kg (138 lb)   01/08/21 66.2 kg (146 lb)   12/07/20 65.8 kg (145 lb)       There is no height or weight on file to calculate BMI.  No height and weight on file for this encounter.  There were no vitals filed for this visit.  Vital signs reviewed.   General Appearance:    Alert, cooperative, in no acute distress                    Ortho exam  Physical exam of the left  "knee reveals no effusion no redness.  The patient does have tenderness about the lateral joint line.  No tenderness about the medial joint line.  A negative bounce home and a positive lateral Rolando.    Patient has a stable ligamentous exam.  The patient has a negative Lachman and negative anterior drawer and a negative pivot shift.  Quads are reasonable and symmetric bilaterally.  Calf is soft and nontender.  There is no overlying skin changes no lymphedema lymphadenopathy.  Patient has good hip range of motion full symmetric and asymptomatic and a normal ankle exam  Physical Exam: 51 y.o. female  General Appearance:    Alert, cooperative, in no acute distress                      Vitals:    03/08/21 1417   Temp: 98.2 °F (36.8 °C)   Weight: 63 kg (139 lb)   Height: 154.9 cm (61\")   PainSc:   8        Head:    Normocephalic, without obvious abnormality, atraumatic   Eyes:            conjunctivae and sclerae normal, no pallor, corneas clear,    Ears:    Ears appear intact with no abnormalities noted   Throat:   No oral lesions, no thrush, oral mucosa moist   Neck:   No adenopathy, supple, trachea midline, no thyromegaly,    Back:     No kyphosis present, no scoliosis present, no skin lesions,      erythema or scars, no tenderness to percussion or                   palpation,   range of motion normal   Lungs:     Clear to auscultation,respirations regular, even and                  unlabored    Heart:    Regular rhythm and normal rate               Chest Wall:    No abnormalities observed   Abdomen:     Normal bowel sounds, no masses, no organomegaly, soft        non-tender, non-distended, no guarding, no rebound                tenderness   Rectal:     Deferred   Extremities:    Moves all extremities well, no edema,   no cyanosis, no redness   Pulses:   Pulses palpable and equal bilaterally   Skin:   No bleeding, bruising or rash   Lymph nodes:   No palpable adenopathy   Neurologic:   Appears neurologic intact "       Did review x-rays did review MRI         .time    Assessment: Persistent left knee pain despite conservative measures I still think a lot of this is patellofemoral she does have a change in the lateral meniscus that could represent some of her symptoms plan is to proceed with arthroscopy as I think we failed everything else conservative she is agreeable to do this we did discuss in detail risk benefits and alternatives  The patient voiced understanding of the risks, benefits, and alternative forms of treatment that were discussed and the patient consents to proceed with the above listed surgery.  All risks, benefits and alternatives were discussed.  Risks including to but not exclusive to anesthetic complications, including death, MI, CVA, infection, bleeding DVT, fracture, residual pain and need for future surgery.  She understands and agrees to proceed    Plan:   Follow up as indicated.  Ice, elevate, and rest as needed.  Discussed conservative measures of pain control including ice, bracing.  Also talked about the importance of strengthening and maintaining ideal body weight    Mildred Faulkner M.D.

## 2021-03-11 ENCOUNTER — TELEPHONE (OUTPATIENT)
Dept: ORTHOPEDIC SURGERY | Facility: CLINIC | Age: 51
End: 2021-03-11

## 2021-03-11 NOTE — TELEPHONE ENCOUNTER
----- Message from Nisha Beck sent at 3/11/2021  8:27 AM EST -----  Regarding: Visit Follow-Up Question  Contact: [phone]  Good am doctor, sorry to bother.  Just some clarification, i saw the after visit summary and order as attached, it says there obtain consent and education about NPO status to be completed by 3/13.  I work weekends and i will start work tomorrow till Sunday night.  No one has called me yet regarding surgery schedule, just asking if needed to take off work this weekend or what to do.  Tried calling surgery schedule but being routed to your office for questions... sorry again for the bother. I really appreciate all the understanding and care.    Hope you have a great day.     Sincerely,  Nisha ZAPATA

## 2021-03-12 ENCOUNTER — TRANSCRIBE ORDERS (OUTPATIENT)
Dept: LAB | Facility: HOSPITAL | Age: 51
End: 2021-03-12

## 2021-03-12 DIAGNOSIS — Z01.818 OTHER SPECIFIED PRE-OPERATIVE EXAMINATION: Primary | ICD-10-CM

## 2021-03-12 PROBLEM — M25.562 CHRONIC PAIN OF LEFT KNEE: Status: ACTIVE | Noted: 2021-03-12

## 2021-03-12 PROBLEM — G89.29 CHRONIC PAIN OF LEFT KNEE: Status: ACTIVE | Noted: 2021-03-12

## 2021-03-12 NOTE — TELEPHONE ENCOUNTER
I HAVE CALLED AND SPOKE WITH PATIENT.  I SCHEDULED HER SURGERY FOR Wednesday 3/17TH. I HAVE ANSWERED HER QUESTIONS AS WELL.

## 2021-03-15 ENCOUNTER — LAB (OUTPATIENT)
Dept: LAB | Facility: HOSPITAL | Age: 51
End: 2021-03-15

## 2021-03-15 ENCOUNTER — APPOINTMENT (OUTPATIENT)
Dept: PHYSICAL THERAPY | Facility: HOSPITAL | Age: 51
End: 2021-03-15

## 2021-03-15 DIAGNOSIS — Z01.818 OTHER SPECIFIED PRE-OPERATIVE EXAMINATION: ICD-10-CM

## 2021-03-15 LAB — SARS-COV-2 ORF1AB RESP QL NAA+PROBE: NOT DETECTED

## 2021-03-15 PROCEDURE — C9803 HOPD COVID-19 SPEC COLLECT: HCPCS

## 2021-03-15 PROCEDURE — U0004 COV-19 TEST NON-CDC HGH THRU: HCPCS

## 2021-03-16 ENCOUNTER — APPOINTMENT (OUTPATIENT)
Dept: PREADMISSION TESTING | Facility: HOSPITAL | Age: 51
End: 2021-03-16

## 2021-03-16 VITALS
OXYGEN SATURATION: 100 % | RESPIRATION RATE: 18 BRPM | SYSTOLIC BLOOD PRESSURE: 140 MMHG | HEART RATE: 102 BPM | BODY MASS INDEX: 25.68 KG/M2 | WEIGHT: 136 LBS | HEIGHT: 61 IN | DIASTOLIC BLOOD PRESSURE: 95 MMHG | TEMPERATURE: 98.3 F

## 2021-03-16 LAB
ANION GAP SERPL CALCULATED.3IONS-SCNC: 10 MMOL/L (ref 5–15)
BUN SERPL-MCNC: 9 MG/DL (ref 6–20)
BUN/CREAT SERPL: 11.5 (ref 7–25)
CALCIUM SPEC-SCNC: 9.1 MG/DL (ref 8.6–10.5)
CHLORIDE SERPL-SCNC: 104 MMOL/L (ref 98–107)
CO2 SERPL-SCNC: 25 MMOL/L (ref 22–29)
CREAT SERPL-MCNC: 0.78 MG/DL (ref 0.57–1)
DEPRECATED RDW RBC AUTO: 37.2 FL (ref 37–54)
ERYTHROCYTE [DISTWIDTH] IN BLOOD BY AUTOMATED COUNT: 12.3 % (ref 12.3–15.4)
GFR SERPL CREATININE-BSD FRML MDRD: 78 ML/MIN/1.73
GFR SERPL CREATININE-BSD FRML MDRD: 95 ML/MIN/1.73
GLUCOSE SERPL-MCNC: 91 MG/DL (ref 65–99)
HCG SERPL QL: NEGATIVE
HCT VFR BLD AUTO: 44.2 % (ref 34–46.6)
HGB BLD-MCNC: 14.8 G/DL (ref 12–15.9)
MCH RBC QN AUTO: 28.2 PG (ref 26.6–33)
MCHC RBC AUTO-ENTMCNC: 33.5 G/DL (ref 31.5–35.7)
MCV RBC AUTO: 84.2 FL (ref 79–97)
PLATELET # BLD AUTO: 389 10*3/MM3 (ref 140–450)
PMV BLD AUTO: 9.6 FL (ref 6–12)
POTASSIUM SERPL-SCNC: 4.3 MMOL/L (ref 3.5–5.2)
RBC # BLD AUTO: 5.25 10*6/MM3 (ref 3.77–5.28)
SODIUM SERPL-SCNC: 139 MMOL/L (ref 136–145)
WBC # BLD AUTO: 6.47 10*3/MM3 (ref 3.4–10.8)

## 2021-03-16 PROCEDURE — 84703 CHORIONIC GONADOTROPIN ASSAY: CPT

## 2021-03-16 PROCEDURE — 85027 COMPLETE CBC AUTOMATED: CPT

## 2021-03-16 PROCEDURE — 36415 COLL VENOUS BLD VENIPUNCTURE: CPT

## 2021-03-16 PROCEDURE — 80048 BASIC METABOLIC PNL TOTAL CA: CPT

## 2021-03-16 RX ORDER — ACETAMINOPHEN 500 MG
TABLET ORAL
COMMUNITY
Start: 2020-12-08

## 2021-03-16 RX ORDER — MELOXICAM 15 MG/1
15 TABLET ORAL DAILY
COMMUNITY

## 2021-03-17 ENCOUNTER — ANESTHESIA EVENT (OUTPATIENT)
Dept: PERIOP | Facility: HOSPITAL | Age: 51
End: 2021-03-17

## 2021-03-17 ENCOUNTER — HOSPITAL ENCOUNTER (OUTPATIENT)
Facility: HOSPITAL | Age: 51
Setting detail: HOSPITAL OUTPATIENT SURGERY
Discharge: HOME | End: 2021-03-17
Attending: ORTHOPAEDIC SURGERY | Admitting: ORTHOPAEDIC SURGERY

## 2021-03-17 ENCOUNTER — ANESTHESIA (OUTPATIENT)
Dept: PERIOP | Facility: HOSPITAL | Age: 51
End: 2021-03-17

## 2021-03-17 VITALS
RESPIRATION RATE: 16 BRPM | TEMPERATURE: 97.6 F | OXYGEN SATURATION: 99 % | DIASTOLIC BLOOD PRESSURE: 95 MMHG | SYSTOLIC BLOOD PRESSURE: 153 MMHG | HEART RATE: 81 BPM

## 2021-03-17 DIAGNOSIS — M25.562 CHRONIC PAIN OF LEFT KNEE: ICD-10-CM

## 2021-03-17 DIAGNOSIS — G89.29 CHRONIC PAIN OF LEFT KNEE: ICD-10-CM

## 2021-03-17 PROCEDURE — 25010000002 FENTANYL CITRATE (PF) 100 MCG/2ML SOLUTION: Performed by: ANESTHESIOLOGY

## 2021-03-17 PROCEDURE — 25010000002 FENTANYL CITRATE (PF) 100 MCG/2ML SOLUTION: Performed by: NURSE ANESTHETIST, CERTIFIED REGISTERED

## 2021-03-17 PROCEDURE — 25010000002 ONDANSETRON PER 1 MG: Performed by: ANESTHESIOLOGY

## 2021-03-17 PROCEDURE — 25010000002 KETOROLAC TROMETHAMINE PER 15 MG: Performed by: NURSE ANESTHETIST, CERTIFIED REGISTERED

## 2021-03-17 PROCEDURE — 25010000002 HYDROMORPHONE PER 4 MG: Performed by: ANESTHESIOLOGY

## 2021-03-17 PROCEDURE — 25010000002 DEXAMETHASONE PER 1 MG: Performed by: NURSE ANESTHETIST, CERTIFIED REGISTERED

## 2021-03-17 PROCEDURE — 29881 ARTHRS KNE SRG MNISECTMY M/L: CPT | Performed by: ORTHOPAEDIC SURGERY

## 2021-03-17 PROCEDURE — 25010000002 MIDAZOLAM PER 1 MG: Performed by: ANESTHESIOLOGY

## 2021-03-17 PROCEDURE — 25010000003 CEFAZOLIN IN DEXTROSE 2-4 GM/100ML-% SOLUTION: Performed by: ORTHOPAEDIC SURGERY

## 2021-03-17 PROCEDURE — 25010000002 METHYLPREDNISOLONE PER 80 MG: Performed by: ORTHOPAEDIC SURGERY

## 2021-03-17 PROCEDURE — 25010000002 ONDANSETRON PER 1 MG: Performed by: NURSE ANESTHETIST, CERTIFIED REGISTERED

## 2021-03-17 PROCEDURE — 25010000002 PROPOFOL 10 MG/ML EMULSION: Performed by: NURSE ANESTHETIST, CERTIFIED REGISTERED

## 2021-03-17 RX ORDER — LIDOCAINE HYDROCHLORIDE 20 MG/ML
INJECTION, SOLUTION INFILTRATION; PERINEURAL AS NEEDED
Status: DISCONTINUED | OUTPATIENT
Start: 2021-03-17 | End: 2021-03-17 | Stop reason: SURG

## 2021-03-17 RX ORDER — SODIUM CHLORIDE 0.9 % (FLUSH) 0.9 %
10 SYRINGE (ML) INJECTION AS NEEDED
Status: DISCONTINUED | OUTPATIENT
Start: 2021-03-17 | End: 2021-03-17 | Stop reason: HOSPADM

## 2021-03-17 RX ORDER — MIDAZOLAM HYDROCHLORIDE 1 MG/ML
2 INJECTION INTRAMUSCULAR; INTRAVENOUS
Status: DISCONTINUED | OUTPATIENT
Start: 2021-03-17 | End: 2021-03-17 | Stop reason: HOSPADM

## 2021-03-17 RX ORDER — HYDROMORPHONE HYDROCHLORIDE 1 MG/ML
0.25 INJECTION, SOLUTION INTRAMUSCULAR; INTRAVENOUS; SUBCUTANEOUS
Status: DISCONTINUED | OUTPATIENT
Start: 2021-03-17 | End: 2021-03-17 | Stop reason: HOSPADM

## 2021-03-17 RX ORDER — SODIUM CHLORIDE 0.9 % (FLUSH) 0.9 %
10 SYRINGE (ML) INJECTION EVERY 12 HOURS SCHEDULED
Status: DISCONTINUED | OUTPATIENT
Start: 2021-03-17 | End: 2021-03-17 | Stop reason: HOSPADM

## 2021-03-17 RX ORDER — ACETAMINOPHEN 500 MG
500 TABLET ORAL ONCE
Status: COMPLETED | OUTPATIENT
Start: 2021-03-17 | End: 2021-03-17

## 2021-03-17 RX ORDER — ONDANSETRON 2 MG/ML
4 INJECTION INTRAMUSCULAR; INTRAVENOUS ONCE AS NEEDED
Status: COMPLETED | OUTPATIENT
Start: 2021-03-17 | End: 2021-03-17

## 2021-03-17 RX ORDER — CEFAZOLIN SODIUM 2 G/100ML
2 INJECTION, SOLUTION INTRAVENOUS ONCE
Status: COMPLETED | OUTPATIENT
Start: 2021-03-17 | End: 2021-03-17

## 2021-03-17 RX ORDER — NALBUPHINE HCL 10 MG/ML
2 AMPUL (ML) INJECTION EVERY 4 HOURS PRN
Status: DISCONTINUED | OUTPATIENT
Start: 2021-03-17 | End: 2021-03-17 | Stop reason: HOSPADM

## 2021-03-17 RX ORDER — MIDAZOLAM HYDROCHLORIDE 1 MG/ML
1 INJECTION INTRAMUSCULAR; INTRAVENOUS
Status: DISCONTINUED | OUTPATIENT
Start: 2021-03-17 | End: 2021-03-17 | Stop reason: HOSPADM

## 2021-03-17 RX ORDER — DEXAMETHASONE SODIUM PHOSPHATE 10 MG/ML
INJECTION INTRAMUSCULAR; INTRAVENOUS AS NEEDED
Status: DISCONTINUED | OUTPATIENT
Start: 2021-03-17 | End: 2021-03-17 | Stop reason: SURG

## 2021-03-17 RX ORDER — HYDROCODONE BITARTRATE AND ACETAMINOPHEN 5; 325 MG/1; MG/1
1 TABLET ORAL EVERY 4 HOURS PRN
Qty: 40 TABLET | Refills: 0 | Status: SHIPPED | OUTPATIENT
Start: 2021-03-17 | End: 2021-08-19

## 2021-03-17 RX ORDER — NALBUPHINE HCL 10 MG/ML
10 AMPUL (ML) INJECTION EVERY 4 HOURS PRN
Status: DISCONTINUED | OUTPATIENT
Start: 2021-03-17 | End: 2021-03-17 | Stop reason: HOSPADM

## 2021-03-17 RX ORDER — FENTANYL CITRATE 50 UG/ML
INJECTION, SOLUTION INTRAMUSCULAR; INTRAVENOUS AS NEEDED
Status: DISCONTINUED | OUTPATIENT
Start: 2021-03-17 | End: 2021-03-17 | Stop reason: SURG

## 2021-03-17 RX ORDER — LIDOCAINE HYDROCHLORIDE 10 MG/ML
0.5 INJECTION, SOLUTION EPIDURAL; INFILTRATION; INTRACAUDAL; PERINEURAL ONCE AS NEEDED
Status: DISCONTINUED | OUTPATIENT
Start: 2021-03-17 | End: 2021-03-17 | Stop reason: HOSPADM

## 2021-03-17 RX ORDER — NALOXONE HCL 0.4 MG/ML
0.4 VIAL (ML) INJECTION AS NEEDED
Status: DISCONTINUED | OUTPATIENT
Start: 2021-03-17 | End: 2021-03-17 | Stop reason: HOSPADM

## 2021-03-17 RX ORDER — HYDRALAZINE HYDROCHLORIDE 20 MG/ML
5 INJECTION INTRAMUSCULAR; INTRAVENOUS
Status: DISCONTINUED | OUTPATIENT
Start: 2021-03-17 | End: 2021-03-17 | Stop reason: HOSPADM

## 2021-03-17 RX ORDER — KETOROLAC TROMETHAMINE 30 MG/ML
INJECTION, SOLUTION INTRAMUSCULAR; INTRAVENOUS AS NEEDED
Status: DISCONTINUED | OUTPATIENT
Start: 2021-03-17 | End: 2021-03-17 | Stop reason: SURG

## 2021-03-17 RX ORDER — HYDROCODONE BITARTRATE AND ACETAMINOPHEN 5; 325 MG/1; MG/1
1 TABLET ORAL ONCE AS NEEDED
Status: COMPLETED | OUTPATIENT
Start: 2021-03-17 | End: 2021-03-17

## 2021-03-17 RX ORDER — SODIUM CHLORIDE, SODIUM LACTATE, POTASSIUM CHLORIDE, AND CALCIUM CHLORIDE .6; .31; .03; .02 G/100ML; G/100ML; G/100ML; G/100ML
IRRIGANT IRRIGATION AS NEEDED
Status: DISCONTINUED | OUTPATIENT
Start: 2021-03-17 | End: 2021-03-17 | Stop reason: HOSPADM

## 2021-03-17 RX ORDER — DIPHENHYDRAMINE HYDROCHLORIDE 50 MG/ML
12.5 INJECTION INTRAMUSCULAR; INTRAVENOUS
Status: DISCONTINUED | OUTPATIENT
Start: 2021-03-17 | End: 2021-03-17 | Stop reason: HOSPADM

## 2021-03-17 RX ORDER — SODIUM CHLORIDE, SODIUM LACTATE, POTASSIUM CHLORIDE, CALCIUM CHLORIDE 600; 310; 30; 20 MG/100ML; MG/100ML; MG/100ML; MG/100ML
9 INJECTION, SOLUTION INTRAVENOUS CONTINUOUS
Status: DISCONTINUED | OUTPATIENT
Start: 2021-03-17 | End: 2021-03-17 | Stop reason: HOSPADM

## 2021-03-17 RX ORDER — FENTANYL CITRATE 50 UG/ML
50 INJECTION, SOLUTION INTRAMUSCULAR; INTRAVENOUS
Status: DISCONTINUED | OUTPATIENT
Start: 2021-03-17 | End: 2021-03-17 | Stop reason: HOSPADM

## 2021-03-17 RX ORDER — PROMETHAZINE HYDROCHLORIDE 25 MG/1
25 TABLET ORAL ONCE AS NEEDED
Status: DISCONTINUED | OUTPATIENT
Start: 2021-03-17 | End: 2021-03-17 | Stop reason: HOSPADM

## 2021-03-17 RX ORDER — PROPOFOL 10 MG/ML
VIAL (ML) INTRAVENOUS AS NEEDED
Status: DISCONTINUED | OUTPATIENT
Start: 2021-03-17 | End: 2021-03-17 | Stop reason: SURG

## 2021-03-17 RX ORDER — ONDANSETRON 2 MG/ML
INJECTION INTRAMUSCULAR; INTRAVENOUS AS NEEDED
Status: DISCONTINUED | OUTPATIENT
Start: 2021-03-17 | End: 2021-03-17 | Stop reason: SURG

## 2021-03-17 RX ADMIN — SODIUM CHLORIDE, POTASSIUM CHLORIDE, SODIUM LACTATE AND CALCIUM CHLORIDE 9 ML/HR: 600; 310; 30; 20 INJECTION, SOLUTION INTRAVENOUS at 11:17

## 2021-03-17 RX ADMIN — ONDANSETRON 4 MG: 2 INJECTION INTRAMUSCULAR; INTRAVENOUS at 10:57

## 2021-03-17 RX ADMIN — PROPOFOL 200 MG: 10 INJECTION, EMULSION INTRAVENOUS at 10:25

## 2021-03-17 RX ADMIN — DEXAMETHASONE SODIUM PHOSPHATE 8 MG: 10 INJECTION INTRAMUSCULAR; INTRAVENOUS at 10:57

## 2021-03-17 RX ADMIN — HYDROMORPHONE HYDROCHLORIDE 0.25 MG: 1 INJECTION, SOLUTION INTRAMUSCULAR; INTRAVENOUS; SUBCUTANEOUS at 12:57

## 2021-03-17 RX ADMIN — LIDOCAINE HYDROCHLORIDE 60 MG: 20 INJECTION, SOLUTION INFILTRATION; PERINEURAL at 10:25

## 2021-03-17 RX ADMIN — ACETAMINOPHEN 500 MG: 500 TABLET ORAL at 09:50

## 2021-03-17 RX ADMIN — KETOROLAC TROMETHAMINE 30 MG: 30 INJECTION, SOLUTION INTRAMUSCULAR at 10:57

## 2021-03-17 RX ADMIN — HYDROMORPHONE HYDROCHLORIDE 0.25 MG: 1 INJECTION, SOLUTION INTRAMUSCULAR; INTRAVENOUS; SUBCUTANEOUS at 12:23

## 2021-03-17 RX ADMIN — SODIUM CHLORIDE, POTASSIUM CHLORIDE, SODIUM LACTATE AND CALCIUM CHLORIDE 9 ML/HR: 600; 310; 30; 20 INJECTION, SOLUTION INTRAVENOUS at 09:45

## 2021-03-17 RX ADMIN — MIDAZOLAM 1 MG: 1 INJECTION INTRAMUSCULAR; INTRAVENOUS at 09:50

## 2021-03-17 RX ADMIN — FENTANYL CITRATE 50 MCG: 50 INJECTION INTRAMUSCULAR; INTRAVENOUS at 10:28

## 2021-03-17 RX ADMIN — FENTANYL CITRATE 50 MCG: 50 INJECTION, SOLUTION INTRAMUSCULAR; INTRAVENOUS at 11:31

## 2021-03-17 RX ADMIN — HYDROCODONE BITARTRATE AND ACETAMINOPHEN 1 TABLET: 5; 325 TABLET ORAL at 11:49

## 2021-03-17 RX ADMIN — CEFAZOLIN SODIUM 2 G: 2 INJECTION, SOLUTION INTRAVENOUS at 10:32

## 2021-03-17 RX ADMIN — FENTANYL CITRATE 50 MCG: 50 INJECTION INTRAMUSCULAR; INTRAVENOUS at 10:25

## 2021-03-17 RX ADMIN — ONDANSETRON 4 MG: 2 INJECTION INTRAMUSCULAR; INTRAVENOUS at 12:23

## 2021-03-17 RX ADMIN — FENTANYL CITRATE 50 MCG: 50 INJECTION, SOLUTION INTRAMUSCULAR; INTRAVENOUS at 11:45

## 2021-03-17 NOTE — ANESTHESIA POSTPROCEDURE EVALUATION
Patient: Nisha Beck    Procedure Summary     Date: 03/17/21 Room / Location:  [facility]  /  [facility]    Anesthesia Start: 1025 Anesthesia Stop: 1114    Procedure: KNEE ARTHROSCOPY LEFT, DEBRIDEMENT PATELAR FEMORAL JOINT, PARTAL LATARAL MENISECTOMY (Left Knee) Diagnosis:       Chronic pain of left knee      (Chronic pain of left knee [M25.562, G89.29])    Surgeons: Mildred Faulkner MD Provider: Clark Ray MD    Anesthesia Type: general ASA Status: 2          Anesthesia Type: general    Vitals  Vitals Value Taken Time   /101 03/17/21 1200   Temp 36.4 °C (97.6 °F) 03/17/21 1112   Pulse 96 03/17/21 1207   Resp 16 03/17/21 1200   SpO2 99 % 03/17/21 1208   Vitals shown include unvalidated device data.        Post Anesthesia Care and Evaluation    Patient location during evaluation: bedside  Patient participation: complete - patient participated  Level of consciousness: awake and alert  Pain management: adequate  Airway patency: patent  Anesthetic complications: No anesthetic complications    Cardiovascular status: acceptable  Respiratory status: acceptable  Hydration status: acceptable    Comments: BP (!) 156/101   Pulse 89   Temp 36.4 °C (97.6 °F) (Temporal)   Resp 16   LMP 02/17/2021   SpO2 100%

## 2021-03-17 NOTE — ANESTHESIA PROCEDURE NOTES
Airway  Urgency: elective    Date/Time: 3/17/2021 10:30 AM  Airway not difficult    General Information and Staff    Patient location during procedure: OR  Anesthesiologist: Clark Ray MD  CRNA: Kanchan Hdz CRNA    Indications and Patient Condition  Indications for airway management: airway protection    Preoxygenated: yes  MILS not maintained throughout  Mask difficulty assessment: 1 - vent by mask    Final Airway Details  Final airway type: supraglottic airway      Successful airway: classic  Size 4    Number of attempts at approach: 1  Assessment: lips, teeth, and gum same as pre-op    Additional Comments  Preoxygenation FEO2 >85, SIVI, LMA placed with ease, teeth/lips as preop. Secured and placement confirmed.

## 2021-03-17 NOTE — ANESTHESIA PREPROCEDURE EVALUATION
Anesthesia Evaluation     no history of anesthetic complications:  NPO Solid Status: > 8 hours             Airway   Mallampati: I  TM distance: >3 FB  Neck ROM: full  No difficulty expected  Dental      Pulmonary - normal exam   (+) a smoker Former,   Cardiovascular - negative cardio ROS and normal exam        Neuro/Psych- negative ROS  GI/Hepatic/Renal/Endo    (+)  GERD,      Musculoskeletal     Abdominal    Substance History      OB/GYN          Other                        Anesthesia Plan    ASA 2     general   (  D/W R&B of GA including but not limited to: heart, lung, liver, kidney, neurologic problems, positioning injuries, dental damage, corneal abrasion and TMJ.  .)  intravenous induction     Anesthetic plan, all risks, benefits, and alternatives have been provided, discussed and informed consent has been obtained with: patient.

## 2021-03-18 ENCOUNTER — PATIENT MESSAGE (OUTPATIENT)
Dept: ORTHOPEDIC SURGERY | Facility: CLINIC | Age: 51
End: 2021-03-18

## 2021-03-18 ENCOUNTER — TELEPHONE (OUTPATIENT)
Dept: ORTHOPEDIC SURGERY | Facility: CLINIC | Age: 51
End: 2021-03-18

## 2021-03-18 NOTE — TELEPHONE ENCOUNTER
TRIED WARM TRANSFERRING W/NO ANSWER.      Caller: NISHA BECK    Relationship to patient: SELF    Best call back number: [phone]    Patient is needing: PT MESSAGED DR. LINTON IN [facility] CONCERNING POST OP APPT.  SHE CALLED BACK AND STATED THAT KENNETH JONES MA REQUESTED HER TO CALL AND MAKE APPT FOR 1 OR 2 WEEKS OUT. PT NEEDS TO BE SCHEDULED AND CLARIFICATION IF IT SHOULD BE 1 OR 2 WEEK POST OP, .      ORIGINAL MESSAGE BELOW.    ----- Message from Nisha Beck sent at 3/18/2021  8:35 AM EDT -----  Regarding: Visit Follow-Up Question  Contact: [phone]  Good am Dr. Linton.  I was not able to see you again yesterday after the procedure, i know you were busy.  Got the after visit summary but it does not say when to see you for ffup post op.  Just wanted to know also how the procedure went, in terms of what you saw and did i inside the knee.  I have been elevating it and putting ice as often when awake, pain is tolerable now than yesterday, i can walk to bathroom with the crutches with ease. Touch toe weight bearing is what i am doing.   Do I just call your office to schedule a ff up? Thanks so so much for the patient and care. Everyone yesterday have been very kind and amazing.     Have a dann day,  Nisha ZAPATA

## 2021-03-18 NOTE — TELEPHONE ENCOUNTER
----- Message from Nisha Beck sent at 3/18/2021  8:35 AM EDT -----  Regarding: Visit Follow-Up Question  Contact: [phone]  Good am Dr. Faulkner.  I was not able to see you again yesterday after the procedure, i know you were busy.  Got the after visit summary but it does not say when to see you for ffup post op.  Just wanted to know also how the procedure went, in terms of what you saw and did i inside the knee.  I have been elevating it and putting ice as often when awake, pain is tolerable now than yesterday, i can walk to bathroom with the crutches with ease. Touch toe weight bearing is what i am doing.   Do I just call your office to schedule a ff up? Thanks so so much for the patient and care. Everyone yesterday have been very kind and amazing.    Have a dann day,  Nisha ZAPATA

## 2021-03-19 ENCOUNTER — TELEPHONE (OUTPATIENT)
Dept: ORTHOPEDIC SURGERY | Facility: CLINIC | Age: 51
End: 2021-03-19

## 2021-03-19 NOTE — TELEPHONE ENCOUNTER
----- Message from Nisha Beck sent at 3/18/2021  4:29 PM EDT -----  Regarding: RE: Visit Follow-Up Question  Contact: [phone]  Zach Ms. Spence,    I called the office for appointments for my post op ff up but they said they did not see any message, told them about your message but they said they will reach out and confirm first if it's 7-10 day after post op for ff up.  I tried to tell them that it is what Dr. Faulkner said but appointment said they still want to reach out to Dr. Faulkner office.     Hoping for assistance..thanks.    Nisha  ----- Message -----  From: LARA MARTINEZ  Sent: 3/18/21, 10:50  To: Nisha Beck  Subject: RE: Visit Follow-Up Question    Hi Miss Cameron,    Dr. 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  You can progress your weightbearing as tolerated.  Please call the office to get your post op scheduled at [phone].  7-10 days from date of surgery.    Keep icing and elevating,  Bebe      ----- Message -----       From:Nisha Beck       Sent:3/18/2021  8:35 AM EDT         To:Mildred Faulkner MD    Subject:Visit Follow-Up Question    Good am Dr. Faulkner.  I was not able to see you again yesterday after the procedure, i know you were busy.  Got the after visit summary but it does not say when to see you for ffup post op.  Just wanted to know also how the procedure went, in terms of what you saw and did i inside the knee.  I have been elevating it and putting ice as often when awake, pain is tolerable now than yesterday, i can walk to bathroom with the crutches with ease. Touch toe weight bearing is what i am doing.   Do I just call your office to schedule a ff up? Thanks so so much for the patient and care. Everyone yesterday have been very kind and amazing.    Have a dann day,  Nisha ZAPATA

## 2021-03-22 NOTE — TELEPHONE ENCOUNTER
I left a message on her machine she does need to follow-up anywhere from 7 to 14 days following her surgery I talked her about what was found at the time of surgery this was all left and a message

## 2021-03-24 NOTE — PROGRESS NOTES
Left Knee Scope follow Up 1st Visit      Patient: Nisha Beck        YOB: 1970      Chief Complaints: left knee pain      History of Present Illness: Pt is here f/u knee arthroscopy she states she is doing good she has a pain that is a 4 out of 10 progress her activity she is using 1 crutch still but using it in the incorrect hand therefore we will change this.        Allergies:   Allergies   Allergen Reactions   • Celebrex [Celecoxib] Anaphylaxis       Medications:   Home Medications:  Current Outpatient Medications on File Prior to Visit   Medication Sig   • acetaminophen (Acetaminophen Extra Strength) 500 MG tablet    • Ascorbic Acid (VITAMIN C PO) Take  by mouth. HOLD PRIOR TO SURG   • Cholecalciferol (VITAMIN D3 PO) Take  by mouth. HOLD PRIOR TO SURG   • Collagen-Boron-Hyaluronic Acid 10-5-3.3 MG tablet Take  by mouth. HOLD PRIOR TO SURG   • diphenhydrAMINE-acetaminophen (TYLENOL PM)  MG tablet per tablet Take 1 tablet by mouth At Night As Needed.   • ELDERBERRY PO Take  by mouth. HOLD PRIOR TO SURG   • HYDROcodone-acetaminophen (NORCO) 5-325 MG per tablet Take 1 tablet by mouth Every 4 (Four) Hours As Needed for Severe Pain .   • meloxicam (MOBIC) 15 MG tablet Take 15 mg by mouth Daily. HOLD PRIOR TO SURG   • Misc Natural Products (Joint Health) capsule Take  by mouth. HOLD PRIOR TO SURG   • Multiple Vitamins-Minerals (ZINC PO) Take 50 mg by mouth. HOLD PRIOR TO SURG   • temazepam (RESTORIL) 30 MG capsule Take 30 mg by mouth every night at bedtime.   • TiZANidine (Zanaflex) 4 MG capsule Take 4 mg by mouth At Night As Needed.   • TURMERIC PO HOLD PRIOR TO SURG   • VITAMIN E PO Take  by mouth. HOLD PRIOR TO SURG     No current facility-administered medications on file prior to visit.     Current Medications:  Scheduled Meds:  Continuous Infusions:No current facility-administered medications for this visit.    PRN Meds:.          Physical Exam: 51 y.o. female  General Appearance:    Alert,  cooperative, in no acute distress                 There were no vitals filed for this visit.   Patient is alert and oriented ×3 no acute distress normal mood physical exam.  Physical exam of the knee, incisions looked good there is no erythema, calf is soft and non-tender.  No sign or sx of DVT      Assessment  S/P knee scope.  I did review intraoperative findings and arthroscopic pictures with the patient.          Plan: To remove sutures today place Steri-Strips and start into  physical therapy and I will have thrm follow up in 3 weeks.  I will keep her off work have her work with physical therapy we will work on getting the crutch in the correct hand and then weaning off the crutches she states she took her last pain medicine yesterday would prefer to continue over-the-counter medications                Answers for HPI/ROS submitted by the patient on 3/24/2021  Please describe your symptoms.: Post op arthroscopy, pain at site  Have you had these symptoms before?: No  How long have you been having these symptoms?: 1-4 days  Please list any medications you are currently taking for this condition.: Norco and ibuprofen  Please describe any probable cause for these symptoms. : Post op  What is the primary reason for your visit?: Other

## 2021-03-25 ENCOUNTER — OFFICE VISIT (OUTPATIENT)
Dept: ORTHOPEDIC SURGERY | Facility: CLINIC | Age: 51
End: 2021-03-25

## 2021-03-25 VITALS — BODY MASS INDEX: 25.34 KG/M2 | TEMPERATURE: 98.6 F | WEIGHT: 134.2 LBS | HEIGHT: 61 IN

## 2021-03-25 DIAGNOSIS — Z98.890 S/P ARTHROSCOPY OF KNEE: Primary | ICD-10-CM

## 2021-03-25 PROCEDURE — 99024 POSTOP FOLLOW-UP VISIT: CPT | Performed by: ORTHOPAEDIC SURGERY

## 2021-03-26 ENCOUNTER — HOSPITAL ENCOUNTER (OUTPATIENT)
Dept: PHYSICAL THERAPY | Facility: HOSPITAL | Age: 51
Setting detail: THERAPIES SERIES
Discharge: HOME | End: 2021-03-26

## 2021-03-26 DIAGNOSIS — Z47.89 ORTHOPEDIC AFTERCARE: ICD-10-CM

## 2021-03-26 DIAGNOSIS — R26.2 DIFFICULTY WALKING: ICD-10-CM

## 2021-03-26 DIAGNOSIS — M25.562 ACUTE PAIN OF LEFT KNEE: Primary | ICD-10-CM

## 2021-03-26 PROCEDURE — 97110 THERAPEUTIC EXERCISES: CPT

## 2021-03-26 PROCEDURE — 97161 PT EVAL LOW COMPLEX 20 MIN: CPT

## 2021-03-26 NOTE — THERAPY EVALUATION
Outpatient Physical Therapy Ortho Initial Evaluation   [place]     Patient Name: Nisha Beck  : 1970  MRN: [id number]  Today's Date: 3/26/2021      Visit Date: 2021    Patient Active Problem List   Diagnosis   • Chronic pain of left knee        Past Medical History:   Diagnosis Date   • GERD (gastroesophageal reflux disease)    • History of herpes simplex infection     COLD SORE   • Insomnia    • Left knee pain         Past Surgical History:   Procedure Laterality Date   • CYST REMOVAL      right side of neck   • ESSURE TUBAL LIGATION     • KNEE ARTHROSCOPY Left 3/17/2021    Procedure: KNEE ARTHROSCOPY LEFT, DEBRIDEMENT PATELAR FEMORAL JOINT, PARTAL LATARAL MENISECTOMY;  Surgeon: Mildred Faulkner MD;  [place] [facility] [place] [facility] [place]  Service: [place]opedics;  Laterality: Left;       Visit Dx:     ICD-10-CM ICD-9-CM   1. Acute pain of left knee  M25.562 719.46   2. Difficulty walking  R26.2 719.7   3. Orthopedic aftercare  Z47.89 V54.9         Patient History     Row Name 21 1600 21 1357          History    Chief Complaint  Difficulty Walking;Pain;Swelling;Tightness;Tinglings  -RS  Difficulty Walking;Pain;Swelling;Tightness;Tinglings  (Pended)   (Patient-Rptd)   -patient     Type of Pain  Hip pain;Knee pain  -RS  Hip pain;Knee pain  (Pended)   (Patient-Rptd)   -patient     Date Current Problem(s) Began  20  -RS  20  (Pended)   (Patient-Rptd)   -patient     Brief Description of Current Complaint  The pt is a 52 yo female who presents with L knee pain s/p L knee scope 3/17. Her pain initially began Dec 9, no CJ but had significant pain and swelling. She tried PT unsuccessfully. Since surgery she has been using a crutch, her surgeon said to change to the opposite side. If she walks without a crutch she limps and her R leg hurts. 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 She has stairs at home. She has mild swelling and has been  applying ice often and wearing a  Soft knee sleeve. Has been sleeping and sitting with pillows under knee in slightly flexed position.  -RS  Post op left knee arthroscopy with lateral meniscectomy  (Pended)   (Patient-Rptd)   -patient     Previous treatment for THIS PROBLEM  Surgery  -RS  --     Surgery Date:  03/07/21  -RS  --     Patient/Caregiver Goals  Return to prior level of function;Return to work;Improve mobility;Improve strength;Know what to do to help the symptoms;Decrease swelling;Heal wound  -RS  Return to prior level of function;Return to work;Improve mobility;Improve strength;Know what to do to help the symptoms;Decrease swelling;Heal wound  (Pended)   (Patient-Rptd)   -patient     Hand Dominance  right-handed  -RS  right-handed  (Pended)   (Patient-Rptd)   -patient     Occupation/sports/leisure activities  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  Needs to drive always and do errands. Likes walking and cleaning the house.  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  Needs to drive always and do errands. Likes walking and cleaning the house.  (Pended)   (Patient-Rptd)   -patient     Patient seeing anyone else for problem(s)?  Dr. Miriam Reyes  -RS  Dr. Miriam Reyes  (Pended)   (Patient-Rptd)   -patient     What clinical tests have you had for this problem?  X-ray;MRI;Blood Work;Other 1 (comment);Other 2 (comment)  -RS  X-ray;MRI;Blood Work;Other 1 (comment);Other 2 (comment)  (Pended)   (Patient-Rptd)   -patient     Additional Clinical Tests  --  Arthroscopy  (Pended)   (Patient-Rptd)   -patient     Other Clinical Tests  --  Steroid injection  (Pended)   (Patient-Rptd)   -patient     Are you or can you be pregnant  --  No  (Pended)   (Patient-Rptd)   -patient        Pain     Pain Location  Knee  -RS  --     Pain at Present  4  -RS  --        Fall Risk Assessment    Any falls in the past year:  No  -RS   No  (Pended)   (Patient-Rptd)   -patient        Services    Prior Rehab/Home Health Experiences  --  -RS  Yes  (Pended)   (Patient-Rptd)   -patient     Are you currently receiving Home Health services  No  -RS  No  (Pended)   (Patient-Rptd)   -patient     Do you plan to receive Home Health services in the near future  No  -RS  No  (Pended)   (Patient-Rptd)   -patient        Daily Activities    Primary Language  Other (comment)  -RS  Other (comment)  (Pended)   (Patient-Rptd)   -patient     Primary Language Comment  Taiwanese  -RS  Welsh  (Pended)   (Patient-Rptd)   -patient     Are you able to read  Yes  -RS  Yes  (Pended)   (Patient-Rptd)   -patient     Are you able to write  Yes  -RS  Yes  (Pended)   (Patient-Rptd)   -patient     How does patient learn best?  Listening  -RS  Listening  (Pended)   (Patient-Rptd)   -patient     Pt Participated in POC and Goals  Yes  -RS  --        Safety    Are you being hurt, hit, or frightened by anyone at home or in your life?  No  -RS  No  (Pended)   (Patient-Rptd)   -patient     Are you being neglected by a caregiver  No  -RS  No  (Pended)   (Patient-Rptd)   -patient     Have you had any of the following issues with  N/A  -RS  N/A  (Pended)   (Patient-Rptd)   -patient       User Key  (r) = Recorded By, (t) = Taken By, (c) = Cosigned By    Initials Name Provider Type    RS Lori Luciano, PT Physical Therapist    patient Nisha Reddyil --          PT Ortho     Row Name 03/26/21 1600       Posture/Observations    Observations  Edema;Incision healing mild edema  -RS       Patellar Accessory Motions    Superior glide  WNL  -RS    Inferior glide  WNL  -RS    Medial glide  WNL  -RS    Lateral glide  WNL  -RS       General ROM    LT Lower Ext  Lt Knee Extension/Flexion  -RS    GENERAL ROM COMMENTS  R knee WFL  -RS       Left Lower Ext    Lt Knee Extension/Flexion AROM  lacking   -RS    Lt Knee Extension/Flexion PROM  lacking 7-110  -RS       MMT (Manual Muscle Testing)     "Rt Lower Ext  Rt Hip Flexion;Rt Hip ABduction;Rt Knee Extension;Rt Knee Flexion;Rt Ankle Plantarflexion;Rt Ankle Dorsiflexion  -RS    Lt Lower Ext  Lt Hip Flexion;Lt Hip ABduction;Lt Knee Extension;Lt Knee Flexion;Lt Ankle Plantarflexion;Lt Ankle Dorsiflexion  -RS       MMT Right Lower Ext    Rt Hip Flexion MMT, Gross Movement  (4+/5) good plus  -RS    Rt Hip ABduction MMT, Gross Movement  (3+/5) fair plus  -RS    Rt Knee Extension MMT, Gross Movement  (5/5) normal  -RS    Rt Knee Flexion MMT, Gross Movement  (4+/5) good plus  -RS    Rt Ankle Plantarflexion MMT, Gross Movement  (4+/5) good plus  -RS    Rt Ankle Dorsiflexion MMT, Gross Movement  (4+/5) good plus  -RS       MMT Left Lower Ext    Lt Hip Flexion MMT, Gross Movement  (4-/5) good minus  -RS    Lt Hip ABduction MMT, Gross Movement  (3+/5) fair plus  -RS    Lt Knee Extension MMT, Gross Movement  (3-/5) fair minus  -RS    Lt Knee Flexion MMT, Gross Movement  (3+/5) fair plus  -RS    Lt Ankle Plantarflexion MMT, Gross Movement  (4-/5) good minus  -RS    Lt Ankle Dorsiflexion MMT, Gross Movement  (4+/5) good plus  -RS       Gait/Stairs (Locomotion)    Comment (Gait/Stairs)  Pt ambulates with crutch in RUE, dec stance time LLE and antalgic pattern noted when not using AD. LL knee slightly flexed thorughout gait cycle.  -RS      User Key  (r) = Recorded By, (t) = Taken By, (c) = Cosigned By    Initials Name Provider Type    RS Lori Luciano PT Physical Therapist                      Therapy Education  Education Details: reviewed tissue healing and what was done during the surgery as pt reports fear of \"hurting the knee\". Advised pt regarding sleeping positions  as she was fearful to go to sidelying, encouraged her to try sl with pillow between knees for comfort. Provided reassurance that she was healing well and was on the right track post operatively.  Given: HEP, Symptoms/condition management  Program: New  How Provided: Demonstration, Written  Provided " to: Patient  Level of Understanding: Verbalized, Demonstrated     PT OP Goals     Row Name 03/26/21 1600          PT Short Term Goals    STG Date to Achieve  04/16/21  -RS     STG 1  The pt will demonstrate IND and compliant with initial HEP focused on L knee mobility and quad stability.  -RS     STG 1 Progress  New  -RS     STG 2  The pt will demonstrate L knee PROM to at least 0-120 to facilitate improved gait pattern.  -RS     STG 2 Progress  New  -RS     STG 3  The pt will ambulate without AD and near normal gait pattern in clinic over even ground.  -RS     STG 3 Progress  New  -RS        Long Term Goals    LTG Date to Achieve  05/25/21  -RS     LTG 1  The pt will demonstrate IND and compliant with progressie HEP focused on return to PLOF and IND condition management.  -RS     LTG 1 Progress  New  -RS     LTG 2  The pt will score no more than 20% disability on the KOS to indicate improved perceived performance of ADLs.  -RS     LTG 2 Progress  New  -RS     LTG 3  The pt will demonstrate L knee AROM to at least 0-125 to facilitate improved stair navigation and gait pattern.  -RS     LTG 3 Progress  New  -RS     LTG 4  The pt will resume reciprocal stair navigation to facilitate improved household navigation.  -RS     LTG 4 Progress  New  -RS     LTG 5  The pt will ambulate without AD for 30 min over even/uneven ground walking outside with pain no greater than 2/10 to facilitate return to recreational activity performance and functional activity tolerance.  -RS     LTG 5 Progress  New  -RS     LTG 6  The pt will perform a squat to 90 with pain no greater than 2/10 to facilitate improved transitional positions and body mechanics when performing pt transfers at work.  -RS     LTG 6 Progress  New  -RS        Time Calculation    PT Goal Re-Cert Due Date  06/24/21  -RS       User Key  (r) = Recorded By, (t) = Taken By, (c) = Cosigned By    Initials Name Provider Type    Lori Dumont PT Physical Therapist           PT Assessment/Plan     Row Name 03/26/21 1700          PT Assessment    Functional Limitations  Decreased safety during functional activities;Limitations in community activities;Performance in sport activities;Limitations in functional capacity and performance;Performance in work activities;Impaired gait;Impaired locomotion;Performance in leisure activities;Limitation in home management;Performance in self-care ADL  -RS     Impairments  Balance;Edema;Endurance;Gait;Impaired flexibility;Locomotion;Muscle strength;Pain;Range of motion;Sensation  -RS     Assessment Comments  Nisha Beck is a 51 y.o. female referred to physical therapy for L knee pain s/p L knee arthroscopy, patellar femoral joint debridement, partial lateral meniscectomy on 3/17/21. She presents with a stable clinical presentation, along with no remarkable comorbidities and personal factors of chronicity of pain, fear avoidance beliefs that may impact her progress in the plan of care. Pt presents today with decreased L knee active and passive mobility, altered gait pattern lacking L terminal knee extension using crutch on R side, decreased L hip and knee strength, mild edema, well healing surgical incisions . her signs and symptoms are consistent with referring diagnosis. The previous impairments limit her ability to perform self care ADLs, ambulate without assistive device, provide childcare for her son, navigate stairs reciprocally, perform work demands as an RN, participate in recreational activities (walking, hiking, badminton). The pt self scores 47% disability on the KOS (0=no disability).Pt will benefit from skilled PT to address the previous impairments and return to PLOF.  -RS     Please refer to paper survey for additional self-reported information  Yes  -RS     Rehab Potential  Good  -RS     Patient/caregiver participated in establishment of treatment plan and goals  Yes  -RS     Patient would benefit from skilled therapy  intervention  Yes  -RS        PT Plan    PT Frequency  2x/week  -RS     Predicted Duration of Therapy Intervention (PT)  8-10 weeks, total of 16-18 sessions  -RS     Planned CPT's?  PT EVAL LOW COMPLEXITY: 22822;PT RE-EVAL: 13551;PT THER PROC EA 15 MIN: 65850;PT THER ACT EA 15 MIN: 90373;PT MANUAL THERAPY EA 15 MIN: 86761;PT NEUROMUSC RE-EDUCATION EA 15 MIN: 80325;PT GAIT TRAINING EA 15 MIN: 36749;PT SELF CARE/HOME MGMT/TRAIN EA 15: 60239;PT HOT OR COLD PACK TREAT MCARE  -RS     PT Plan Comments  Assess tolerance for initial HEP and gait training, continue to focus on L knee active mobility and strength. Consider warm up on nustep, gait train, clamshell, SAQ, mini squat.  -RS       User Key  (r) = Recorded By, (t) = Taken By, (c) = Cosigned By    Initials Name Provider Type    RS Lori Luciano PT Physical Therapist            OP Exercises     Row Name 03/26/21 1600             Total Minutes    46762 - PT Therapeutic Exercise Minutes  18  -RS         Exercise 1    Exercise Name 1  QS towel  -RS      Cueing 1  Verbal;Demo  -RS      Reps 1  10  -RS      Time 1  5s  -RS         Exercise 2    Exercise Name 2  SLR  -RS      Cueing 2  Verbal;Demo  -RS      Reps 2  10  -RS      Time 2  QS between  -RS         Exercise 3    Exercise Name 3  HS stretch seated EOB  -RS      Cueing 3  Verbal;Demo  -RS      Sets 3  3  -RS      Reps 3  20s  -RS         Exercise 4    Exercise Name 4  heel slide seated  -RS      Cueing 4  Verbal;Demo  -RS      Reps 4  10  -RS      Time 4  5s  -RS         Exercise 5    Exercise Name 5  mobility training with crutch  -RS      Time 5  7 min  -RS      Additional Comments  gait and stairs  -RS         Exercise 6    Exercise Name 6  weight shifting lateral  -RS      Cueing 6  Verbal;Demo  -RS      Reps 6  10  -RS      Time 6  5s  -RS        User Key  (r) = Recorded By, (t) = Taken By, (c) = Cosigned By    Initials Name Provider Type    RS Lori Luciano, PT Physical Therapist                         Outcome Measure Options: Knee Outcome Score- ADL  Knee Outcome Score  Knee Outcome Score Comments: 47% disability      Time Calculation:     Start Time: 1615  Stop Time: 1653  Time Calculation (min): 38 min     Therapy Charges for Today     Code Description Service Date Service Provider Modifiers Qty    35726238494 HC PT EVAL LOW COMPLEXITY 2 3/26/2021 Lori Luciano, PT GP 1    12688136063 HC PT THER PROC EA 15 MIN 3/26/2021 Lori Luciano, PT GP 1          PT G-Codes  Outcome Measure Options: Knee Outcome Score- ADL         Lori Luciano PT  3/26/2021

## 2021-03-30 ENCOUNTER — HOSPITAL ENCOUNTER (OUTPATIENT)
Dept: PHYSICAL THERAPY | Facility: HOSPITAL | Age: 51
Setting detail: THERAPIES SERIES
Discharge: HOME | End: 2021-03-30

## 2021-03-30 DIAGNOSIS — R26.2 DIFFICULTY WALKING: ICD-10-CM

## 2021-03-30 DIAGNOSIS — Z47.89 ORTHOPEDIC AFTERCARE: ICD-10-CM

## 2021-03-30 DIAGNOSIS — M25.562 ACUTE PAIN OF LEFT KNEE: Primary | ICD-10-CM

## 2021-03-30 PROCEDURE — 97110 THERAPEUTIC EXERCISES: CPT

## 2021-03-30 NOTE — THERAPY TREATMENT NOTE
Outpatient Physical Therapy Ortho Treatment Note  [facility]     Patient Name: Nisha Beck  : 1970  MRN: [id number]  Today's Date: 3/30/2021      Visit Date: 2021    Visit Dx:    ICD-10-CM ICD-9-CM   1. Acute pain of left knee  M25.562 719.46   2. Difficulty walking  R26.2 719.7   3. Orthopedic aftercare  Z47.89 V54.9       Patient Active Problem List   Diagnosis   • Chronic pain of left knee        Past Medical History:   Diagnosis Date   • GERD (gastroesophageal reflux disease)    • History of herpes simplex infection     COLD SORE   • Insomnia    • Left knee pain         Past Surgical History:   Procedure Laterality Date   • CYST REMOVAL      right side of neck   • ESSURE TUBAL LIGATION     • KNEE ARTHROSCOPY Left 3/17/2021    Procedure: KNEE ARTHROSCOPY LEFT, DEBRIDEMENT PATELAR FEMORAL JOINT, PARTAL LATARAL MENISECTOMY;  Surgeon: Mildred Faulkner MD;  Location: [facility] OR [facility] [place];  Service: Orthopedics;  Laterality: Left;                       PT Assessment/Plan     Row Name 21 1214          PT Assessment    Assessment Comments  Ms. Beck returns for first follow up appointment from evaluation, overall reporting reduction in pain levels 3/10 since surgery and is weaning from crutch in home. Pt. displays improved gait mehcanics with good heel strike and improved weight shift onto L LE. Pt. remains fearful of re-injuring knee, continued with education regarding appropriateness of activities and she remains on track with diagnosis. Pt. tolerated addition of S/L clam, SAQ, mini squat, side-step, berkley berkley and TKE with no increased pain. Demonstrates difficulty with quad initation with TKE but improves with tactile cues and reps. Pt. cued with berkley berkley to focus on heel strike, weight shift and toe off. Pt. remains good candidate for skilled PT.  -        PT Plan    PT Plan Comments  Progress gait training, consider small step up  -       User Key  (r) = Recorded By, (t) = Taken By, (c) =  Cosigned By    Initials Name Provider Type    Laura Mchugh, PT Physical Therapist            OP Exercises     Row Name 03/30/21 1100             Subjective Comments    Subjective Comments  I am only using the crutch for support, im not using it at home  -MH         Subjective Pain    Able to rate subjective pain?  yes  -MH      Pre-Treatment Pain Level  3  -MH         Total Minutes    73013 - PT Therapeutic Exercise Minutes  38  -MH         Exercise 1    Exercise Name 1  QS towel  -MH      Cueing 1  Verbal;Demo  -MH      Reps 1  10  -MH      Time 1  5s  -MH         Exercise 2    Exercise Name 2  SLR  -MH      Cueing 2  Verbal;Demo  -MH      Reps 2  12  -MH      Time 2  QS between  -MH         Exercise 3    Exercise Name 3  HS stretch seated EOB  -MH      Cueing 3  Verbal;Demo  -MH      Sets 3  3  -MH      Reps 3  20s  -MH         Exercise 4    Exercise Name 4  heel slide seated  -MH      Cueing 4  Verbal;Demo  -MH      Reps 4  10  -MH      Time 4  5s  -MH         Exercise 5    Exercise Name 5  SAQ  -MH      Cueing 5  Verbal  -MH      Reps 5  12  -MH      Time 5  --  -MH      Additional Comments  2#  -MH         Exercise 6    Exercise Name 6  weight shifting lateral  -MH      Cueing 6  Verbal;Demo  -MH      Reps 6  10  -MH      Time 6  5s  -MH      Additional Comments  on blue foam  -MH         Exercise 7    Exercise Name 7  S/L clamshell  -MH      Cueing 7  Verbal;Tactile  -MH      Reps 7  12e  -MH      Additional Comments  cues for trunk alignment  -MH         Exercise 8    Exercise Name 8  bridge  -MH      Cueing 8  Verbal  -MH      Reps 8  10  -MH      Time 8  2-3  -MH      Additional Comments  push through heels  -MH         Exercise 9    Exercise Name 9  NuStep  -MH      Cueing 9  Verbal  -MH      Time 9  5min  -MH      Additional Comments  B UE/LE L5  -MH         Exercise 10    Exercise Name 10  mini squat  -MH      Cueing 10  Verbal  -MH      Sets 10  2  -MH      Reps 10  10  -MH         Exercise 11     Exercise Name 11  side step  -      Cueing 11  Verbal  -      Reps 11  3 laps  -      Additional Comments  RTB  -         Exercise 12    Exercise Name 12  berkley berkley  -      Cueing 12  Verbal  -      Reps 12  10  -      Additional Comments  weight shift  -        User Key  (r) = Recorded By, (t) = Taken By, (c) = Cosigned By    Initials Name Provider Type     Laura Guy, PT Physical Therapist                       PT OP Goals     Row Name 03/30/21 1100          PT Short Term Goals    STG Date to Achieve  04/16/21  -     STG 1  The pt will demonstrate IND and compliant with initial HEP focused on L knee mobility and quad stability.  -     STG 1 Progress  Ongoing  -     STG 2  The pt will demonstrate L knee PROM to at least 0-120 to facilitate improved gait pattern.  -     STG 2 Progress  Ongoing  -     STG 3  The pt will ambulate without AD and near normal gait pattern in clinic over even ground.  -     STG 3 Progress  Ongoing  [facility]        Long Term Goals    LTG Date to Achieve  05/25/21  -     LTG 1  The pt will demonstrate IND and compliant with progressie HEP focused on return to PLOF and IND condition management.  -     LTG 1 Progress  Ongoing  -     LTG 2  The pt will score no more than 20% disability on the KOS to indicate improved perceived performance of ADLs.  -     LTG 2 Progress  Ongoing  -     LTG 3  The pt will demonstrate L knee AROM to at least 0-125 to facilitate improved stair navigation and gait pattern.  -     LTG 3 Progress  Ongoing  [facility]     LTG 4  The pt will resume reciprocal stair navigation to facilitate improved household navigation.  -     LTG 4 Progress  Ongoing  -     LTG 5  The pt will ambulate without AD for 30 min over even/uneven ground walking outside with pain no greater than 2/10 to facilitate return to recreational activity performance and functional activity tolerance.  -     LTG 5 Progress  Ongoing  [facility]     LTG 6  The pt will  perform a squat to 90 with pain no greater than 2/10 to facilitate improved transitional positions and body mechanics when performing pt transfers at work.  -     LTG 6 Progress  Ongoing  -       User Key  (r) = Recorded By, (t) = Taken By, (c) = Cosigned By    Initials Name Provider Type    Laura Mchugh, IN[facility] Physical Therapist          Therapy Education  Education Details: Access Code: [email or web address]  Given: HEP, Mobility training  Program: Reinforced, Progressed  How Provided: Verbal, Demonstration, Written  Provided to: Patient              Time Calculation:   Start Time: 1133  Stop Time: 1213  Time Calculation (min): 40 min  Total Timed Code Minutes- PT: 38 minute(s)  Therapy Charges for Today     Code Description Service Date Service Provider Modifiers Qty    [id number] HC PT THER PROC EA 15 MIN 3/30/2021 Laura Guy, PT GP 3                    Laura Guy PT  3/30/2021

## 2021-04-01 ENCOUNTER — HOSPITAL ENCOUNTER (OUTPATIENT)
Dept: PHYSICAL THERAPY | Facility: HOSPITAL | Age: 51
Setting detail: THERAPIES SERIES
Discharge: HOME | End: 2021-04-01

## 2021-04-01 DIAGNOSIS — M25.562 ACUTE PAIN OF LEFT KNEE: Primary | ICD-10-CM

## 2021-04-01 DIAGNOSIS — Z47.89 ORTHOPEDIC AFTERCARE: ICD-10-CM

## 2021-04-01 DIAGNOSIS — R26.2 DIFFICULTY WALKING: ICD-10-CM

## 2021-04-01 PROCEDURE — 97110 THERAPEUTIC EXERCISES: CPT

## 2021-04-01 NOTE — THERAPY TREATMENT NOTE
Outpatient Physical Therapy Ortho Treatment Note  [facility]     Patient Name: Nisha Beck  : 1970  MRN: [id number]  Today's Date: 2021      Visit Date: 2021    Visit Dx:    ICD-10-CM ICD-9-CM   1. Acute pain of left knee  M25.562 719.46   2. Difficulty walking  R26.2 719.7   3. Orthopedic aftercare  Z47.89 V54.9       Patient Active Problem List   Diagnosis   • Chronic pain of left knee        Past Medical History:   Diagnosis Date   • GERD (gastroesophageal reflux disease)    • History of herpes simplex infection     COLD SORE   • Insomnia    • Left knee pain         Past Surgical History:   Procedure Laterality Date   • CYST REMOVAL      right side of neck   • ESSURE TUBAL LIGATION     • KNEE ARTHROSCOPY Left 3/17/2021    Procedure: KNEE ARTHROSCOPY LEFT, DEBRIDEMENT PATELAR FEMORAL JOINT, PARTAL LATARAL MENISECTOMY;  Surgeon: Mildred Faulkner MD;  Location: [facility] OR [facility] [place];  Service: Orthopedics;  Laterality: Left;       PT Ortho     Row Name 21 1000       Left Lower Ext    Lt Knee Extension/Flexion AROM  supine heel slide to 110 flexion  -      User Key  (r) = Recorded By, (t) = Taken By, (c) = Cosigned By    Initials Name Provider Type    Laura Mchugh, PT Physical Therapist                      PT Assessment/Plan     Row Name 21 1000          PT Assessment    Assessment Comments  Ms. Beck presents to clinic with similar complaints, minimal pain levels and feels L knee remains weak and quickly fatigues. Pt. no longer ambulating with single axillary crutch, gait is antalgic upon initial steps but improves with cues for heel strike and weight shift in clinic. Pt. demonstrates improvement in L knee A/AAROM flexion to 110 in supine with reports of mild pain. Pt. tolerated addition of DKTC, seated heel slide, TB to S/L clam and bridge and showed difficulty initiating quad set with SLR + ER. Pt. remains good candidate for skilled PT.  -        PT Plan    PT Plan  Comments  Consider bridge over swiss ball, SL RDL, step ups?  -       User Key  (r) = Recorded By, (t) = Taken By, (c) = Cosigned By    Initials Name Provider Type     Laura Guy, PT Physical Therapist            OP Exercises     Row Name 04/01/21 0900             Subjective Comments    Subjective Comments  It still hurts a little bit and I get tired, I cleaned the house yesterday. I took it slow though  -         Subjective Pain    Able to rate subjective pain?  yes  -      Pre-Treatment Pain Level  3  -         Total Minutes    58243 - PT Therapeutic Exercise Minutes  41  -         Exercise 1    Exercise Name 1  --  -MH      Cueing 1  --  -MH      Reps 1  --  -MH      Time 1  --  -         Exercise 2    Exercise Name 2  SLR  -      Cueing 2  Verbal;Demo  -      Sets 2  1e  -MH      Reps 2  12/8  -      Time 2  QS between  -      Additional Comments  toe straight, toe out  -         Exercise 3    Exercise Name 3  --  -MH      Cueing 3  --  -MH      Sets 3  --  -MH      Reps 3  --  -         Exercise 4    Exercise Name 4  heel slide seated  -      Cueing 4  Verbal;Demo  -      Sets 4  to 110 flexion  -      Reps 4  10  -      Time 4  5s  -      Additional Comments  with strap for increased flexion at end of AROM  -         Exercise 5    Exercise Name 5  SAQ  -      Cueing 5  Verbal  -      Reps 5  15  -      Additional Comments  2#  -         Exercise 6    Exercise Name 6  --  -MH      Cueing 6  --  -MH      Reps 6  --  -MH      Time 6  --  -         Exercise 7    Exercise Name 7  S/L clamshell  -      Cueing 7  Verbal;Tactile  -      Reps 7  12e  -      Additional Comments  YTB  -         Exercise 8    Exercise Name 8  bridge  -      Cueing 8  Verbal  -      Sets 8  YTB at knees to cue abduction  -      Reps 8  10  -      Time 8  2-3  -MH      Additional Comments  push through heels  -         Exercise 9    Exercise Name 9  NuStep  -       Cueing 9  Verbal  -      Time 9  5min  -      Additional Comments  B UE/LE L5  -         Exercise 10    Exercise Name 10  mini squat  -      Cueing 10  Verbal  -      Sets 10  2  -MH      Reps 10  10  -MH         Exercise 11    Exercise Name 11  side step  -      Cueing 11  Verbal  -      Reps 11  3 laps  -      Additional Comments  RTB  -         Exercise 12    Exercise Name 12  berkley berkley  -MH      Cueing 12  Verbal  -      Reps 12  10  -MH         Exercise 13    Exercise Name 13  DKTC  -      Cueing 13  Verbal  -      Reps 13  10  -MH      Additional Comments  2-3 in/out green swiss ball  -         Exercise 14    Exercise Name 14  seated heel slide  -      Cueing 14  Verbal  -      Reps 14  10  -MH      Time 14  2-3sec  -         Exercise 15    Exercise Name 15  gait in clinic  -      Cueing 15  Verbal  -      Additional Comments  cues for heel strike, stride length, weight shift  -        User Key  (r) = Recorded By, (t) = Taken By, (c) = Cosigned By    Initials Name Provider Type    Laura Mchugh, PT Physical Therapist                       PT OP Goals     Row Name 04/01/21 0900          PT Short Term Goals    STG Date to Achieve  04/16/21  -     STG 1  The pt will demonstrate IND and compliant with initial HEP focused on L knee mobility and quad stability.  -     STG 1 Progress  Met  -     STG 2  The pt will demonstrate L knee PROM to at least 0-120 to facilitate improved gait pattern.  -     STG 2 Progress  Ongoing;Progressing  -     STG 3  The pt will ambulate without AD and near normal gait pattern in clinic over even ground.  -     STG 3 Progress  Met  -        Long Term Goals    LTG Date to Achieve  05/25/21  -     LTG 1  The pt will demonstrate IND and compliant with progressie HEP focused on return to PLOF and IND condition management.  -     LTG 1 Progress  Ongoing  -     LTG 2  The pt will score no more than 20% disability on the KOS to  indicate improved perceived performance of ADLs.  -     LTG 2 Progress  Ongoing  -     LTG 3  The pt will demonstrate L knee AROM to at least 0-125 to facilitate improved stair navigation and gait pattern.  -     LTG 3 Progress  Ongoing  -     LTG 4  The pt will resume reciprocal stair navigation to facilitate improved household navigation.  -     LTG 4 Progress  Ongoing  [facility]     LTG 5  The pt will ambulate without AD for 30 min over even/uneven ground walking outside with pain no greater than 2/10 to facilitate return to recreational activity performance and functional activity tolerance.  -     LTG 5 Progress  Ongoing  -     LTG 6  The pt will perform a squat to 90 with pain no greater than 2/10 to facilitate improved transitional positions and body mechanics when performing pt transfers at work.  -[facility] 6 Progress  Ongoing  [facility]       User Key  (r) = Recorded By, (t) = Taken By, (c) = Cosigned By    Initials Name Provider Type     Laura Guy, PT Physical Therapist          Therapy Education  Education Details: Continue HEP; empahsis on heel strike and stride length with ambulation  Given: HEP  Program: Reinforced  How Provided: Verbal, Demonstration  Provided to: Patient  Level of Understanding: Verbalized, Demonstrated              Time Calculation:   Start Time: 0957  Stop Time: 1038  Time Calculation (min): 41 min  Total Timed Code Minutes- PT: 41 minute(s)  Therapy Charges for Today     Code Description Service Date Service Provider Modifiers Qty    [id number]  PT THER PROC EA 15 MIN 4/1/2021 Laura Guy, PT GP 3                    Laura Guy PT  4/1/2021

## 2021-04-02 ENCOUNTER — BULK ORDERING (OUTPATIENT)
Dept: CASE MANAGEMENT | Facility: OTHER | Age: 51
End: 2021-04-02

## 2021-04-02 DIAGNOSIS — Z23 IMMUNIZATION DUE: ICD-10-CM

## 2021-04-07 ENCOUNTER — HOSPITAL ENCOUNTER (OUTPATIENT)
Dept: PHYSICAL THERAPY | Facility: HOSPITAL | Age: 51
Setting detail: THERAPIES SERIES
Discharge: HOME | End: 2021-04-07

## 2021-04-07 DIAGNOSIS — R26.2 DIFFICULTY WALKING: ICD-10-CM

## 2021-04-07 DIAGNOSIS — M25.562 ACUTE PAIN OF LEFT KNEE: Primary | ICD-10-CM

## 2021-04-07 DIAGNOSIS — Z47.89 ORTHOPEDIC AFTERCARE: ICD-10-CM

## 2021-04-07 PROCEDURE — 97110 THERAPEUTIC EXERCISES: CPT

## 2021-04-07 NOTE — THERAPY TREATMENT NOTE
Outpatient Physical Therapy Ortho Treatment Note  [facility]     Patient Name: Nisha Beck  : 1970  MRN: [id number]  Today's Date: 2021      Visit Date: 2021    Visit Dx:    ICD-10-CM ICD-9-CM   1. Acute pain of left knee  M25.562 719.46   2. Difficulty walking  R26.2 719.7   3. Orthopedic aftercare  Z47.89 V54.9       Patient Active Problem List   Diagnosis   • Chronic pain of left knee        Past Medical History:   Diagnosis Date   • GERD (gastroesophageal reflux disease)    • History of herpes simplex infection     COLD SORE   • Insomnia    • Left knee pain         Past Surgical History:   Procedure Laterality Date   • CYST REMOVAL      right side of neck   • ESSURE TUBAL LIGATION     • KNEE ARTHROSCOPY Left 3/17/2021    Procedure: KNEE ARTHROSCOPY LEFT, DEBRIDEMENT PATELAR FEMORAL JOINT, PARTAL LATARAL MENISECTOMY;  Surgeon: Mildred Faulkner MD;  [place] [facility] [place] [facility] [place]  Service: Orthopedics;  Laterality: Left;                       PT Assessment/Plan     Row Name 21 9377          PT Assessment    Assessment Comments  Pt. returns with similar complaints, hoping to return to more walking/activities as able. Discussed with pt. appropriate activity modification in regards to return to walking to ease back into routine/build endurance. Pt. tolerated addition of recumbent bike, LAQ + add, monster walk, and small step up as well as progression of reps with SLR, and clamshell.  -        PT Plan    PT Plan Comments  Has pt. resumed walking? progress as appropriate  -       User Key  (r) = Recorded By, (t) = Taken By, (c) = Cosigned By    Initials Name Provider Type     Laura Guy, PT Physical Therapist            OP Exercises     Row Name 21 1600             Subjective Comments    Subjective Comments  I am trying to do more  -         Subjective Pain    Able to rate subjective pain?  yes  -      Pre-Treatment Pain Level  3  -         Total Minutes    66624 - PT  "Therapeutic Exercise Minutes  38  -MH         Exercise 2    Exercise Name 2  SLR  -MH      Cueing 2  Verbal;Demo  -MH      Sets 2  1e  -MH      Reps 2  12/8  -MH      Time 2  QS between  -MH      Additional Comments  toe straight, toe out  -MH         Exercise 3    Exercise Name 3  small step up  -MH      Cueing 3  Verbal  -MH      Reps 3  10e  -MH      Additional Comments  4\"  -MH         Exercise 4    Exercise Name 4  --  -MH      Cueing 4  --  -MH      Sets 4  --  -MH      Reps 4  --  -MH      Time 4  --  -MH         Exercise 5    Exercise Name 5  LAQ + add  -MH      Cueing 5  Verbal  -MH      Reps 5  15  -MH      Additional Comments  2#  -MH         Exercise 6    Exercise Name 6  monster walk  -MH      Cueing 6  Verbal;Demo  -MH      Reps 6  3 laps  -MH      Additional Comments  RTB  -MH         Exercise 7    Exercise Name 7  S/L clamshell  -MH      Cueing 7  Verbal;Tactile  -MH      Reps 7  15e  -MH      Additional Comments  YTB  -MH         Exercise 8    Exercise Name 8  bridge  -MH      Cueing 8  Verbal  -MH      Sets 8  YTB at knees to cue abduction  -MH      Reps 8  12  -MH      Time 8  2-3  -MH      Additional Comments  push through heels  -MH         Exercise 9    Exercise Name 9  recumbent bike  -MH      Cueing 9  Verbal  -MH      Time 9  5min  -MH      Additional Comments  full revolution seat 4  -MH         Exercise 10    Exercise Name 10  mini squat  -MH      Cueing 10  Verbal  -MH      Sets 10  2  -MH      Reps 10  10  -MH      Additional Comments  blue swiss ball at back  -MH         Exercise 11    Exercise Name 11  side step  -MH      Cueing 11  Verbal  -MH      Reps 11  3 laps  -MH      Additional Comments  RTB  -MH         Exercise 12    Exercise Name 12  TKE  -MH      Cueing 12  Verbal  -MH      Reps 12  10  -MH      Additional Comments  BTB  -MH         Exercise 13    Exercise Name 13  DKTC  -MH      Cueing 13  Verbal  -MH      Reps 13  10  -MH      Time 13  green swiss ball  -MH      " Additional Comments  2-3 sec in/out   -         Exercise 14    Exercise Name 14  seated heel slide  -      Cueing 14  Verbal  -      Reps 14  12  -      Time 14  2-3sec  -      Additional Comments  to 105 knee flexion  -         Exercise 15    Exercise Name 15  --  -      Cueing 15  --  -        User Key  (r) = Recorded By, (t) = Taken By, (c) = Cosigned By    Initials Name Provider Type     Laura Guy, PT Physical Therapist                       PT OP Goals     Row Name 04/07/21 1600          PT Short Term Goals    STG Date to Achieve  04/16/21  -     STG 1  The pt will demonstrate IND and compliant with initial HEP focused on L knee mobility and quad stability.  -     STG 1 Progress  Met  -     STG 2  The pt will demonstrate L knee PROM to at least 0-120 to facilitate improved gait pattern.  -     STG 2 Progress  Ongoing;Progressing  -     STG 3  The pt will ambulate without AD and near normal gait pattern in clinic over even ground.  -     STG 3 Progress  Met  -        Long Term Goals    LTG Date to Achieve  05/25/21  -     LTG 1  The pt will demonstrate IND and compliant with progressie HEP focused on return to PLOF and IND condition management.  -     LTG 1 Progress  Ongoing  -     LTG 2  The pt will score no more than 20% disability on the KOS to indicate improved perceived performance of ADLs.  -     LTG 2 Progress  Ongoing  -     LTG 3  The pt will demonstrate L knee AROM to at least 0-125 to facilitate improved stair navigation and gait pattern.  -     LTG 3 Progress  Ongoing  -     LTG 4  The pt will resume reciprocal stair navigation to facilitate improved household navigation.  -     LTG 4 Progress  Ongoing  -     LTG 5  The pt will ambulate without AD for 30 min over even/uneven ground walking outside with pain no greater than 2/10 to facilitate return to recreational activity performance and functional activity tolerance.  -     LTG 5 Progress   Ongoing  -     LTG 6  The pt will perform a squat to 90 with pain no greater than 2/10 to facilitate improved transitional positions and body mechanics when performing pt transfers at work.  -     LTG 6 Progress  Ongoing  -       User Key  (r) = Recorded By, (t) = Taken By, (c) = Cosigned By    Initials Name Provider Type     Laura Guy, PT Physical Therapist                         Time Calculation:   Start Time: 1615  Stop Time: 1653  Time Calculation (min): 38 min  Total Timed Code Minutes- PT: 38 minute(s)  Therapy Charges for Today     Code Description Service Date Service Provider Modifiers Qty    [id number]  PT THER PROC EA 15 MIN 4/7/2021 Laura Guy, PT GP 3                    Laura Guy PT  4/7/2021

## 2021-04-09 ENCOUNTER — HOSPITAL ENCOUNTER (OUTPATIENT)
Dept: PHYSICAL THERAPY | Facility: HOSPITAL | Age: 51
Setting detail: THERAPIES SERIES
Discharge: HOME | End: 2021-04-09

## 2021-04-09 DIAGNOSIS — Z47.89 ORTHOPEDIC AFTERCARE: ICD-10-CM

## 2021-04-09 DIAGNOSIS — R26.2 DIFFICULTY WALKING: ICD-10-CM

## 2021-04-09 DIAGNOSIS — M25.562 ACUTE PAIN OF LEFT KNEE: Primary | ICD-10-CM

## 2021-04-09 PROCEDURE — 97110 THERAPEUTIC EXERCISES: CPT

## 2021-04-09 NOTE — THERAPY TREATMENT NOTE
Outpatient Physical Therapy Ortho Treatment Note  [facility]     Patient Name: Nisha Beck  : 1970  MRN: [id number]  Today's Date: 2021      Visit Date: 2021    Visit Dx:    ICD-10-CM ICD-9-CM   1. Acute pain of left knee  M25.562 719.46   2. Difficulty walking  R26.2 719.7   3. Orthopedic aftercare  Z47.89 V54.9       Patient Active Problem List   Diagnosis   • Chronic pain of left knee        Past Medical History:   Diagnosis Date   • GERD (gastroesophageal reflux disease)    • History of herpes simplex infection     COLD SORE   • Insomnia    • Left knee pain         Past Surgical History:   Procedure Laterality Date   • CYST REMOVAL      right side of neck   • ESSURE TUBAL LIGATION     • KNEE ARTHROSCOPY Left 3/17/2021    Procedure: KNEE ARTHROSCOPY LEFT, DEBRIDEMENT PATELAR FEMORAL JOINT, PARTAL LATARAL MENISECTOMY;  Surgeon: Mildred Faulkner MD;  Location: [facility] OR [facility] [place];  Service: Orthopedics;  Laterality: Left;                       PT Assessment/Plan     Row Name 21 1258          PT Assessment    Assessment Comments  Ms. Beck returns to clinic noting increased soreness in knee secondary to increased housework/chores yesterday. Pt. continues to expess concern and fear avoidance behaviors despite continued education regarding anatomy of knee/surgery, healing timeframe, and appropriate activities. Pt. able to tolerate ther ex although demonstrates muscular fatigue and poor single limb stance. Added S/L abd, leg  and airex to step up, and SL RDL although repetitions limited due to fatigue at end of session.  -        PT Plan    PT Plan Comments  Consider lunge matrix  -       User Key  (r) = Recorded By, (t) = Taken By, (c) = Cosigned By    Initials Name Provider Type    Laura Mchugh, PT Physical Therapist            OP Exercises     Row Name 21 1200             Subjective Comments    Subjective Comments  I just over-do it and then my knee is sore  -    "      Subjective Pain    Able to rate subjective pain?  yes  -MH      Pre-Treatment Pain Level  3  -MH         Total Minutes    53191 - PT Therapeutic Exercise Minutes  38  -MH         Exercise 2    Exercise Name 2  SLR  -MH      Cueing 2  Verbal;Demo  -MH      Sets 2  1e  -MH      Reps 2  12/10  -MH      Time 2  QS between  -MH      Additional Comments  toe straight, toe out  -MH         Exercise 3    Exercise Name 3  small step up w/ leg   -MH      Cueing 3  Verbal  -MH      Reps 3  10e  -MH      Additional Comments  4\" + airex  -MH         Exercise 4    Exercise Name 4  HS stretch at step  -MH      Cueing 4  Verbal  -MH      Reps 4  3e  -MH      Time 4  20sec  -MH         Exercise 5    Exercise Name 5  LAQ + add  -MH      Cueing 5  Verbal  -MH      Reps 5  15  -MH      Additional Comments  2#  -MH         Exercise 6    Exercise Name 6  monster walk  -      Cueing 6  Verbal;Demo  -MH      Reps 6  3 laps  -MH      Additional Comments  RTB  -         Exercise 7    Exercise Name 7  S/L abduction  -MH      Cueing 7  Verbal;Tactile  -MH      Reps 7  12  -MH      Time 7  --  -MH         Exercise 8    Exercise Name 8  --  -MH      Cueing 8  --  -MH      Sets 8  --  -MH      Reps 8  --  -MH      Time 8  --  -MH         Exercise 9    Exercise Name 9  recumbent bike  -MH      Cueing 9  Verbal  -MH      Time 9  5min  -MH      Additional Comments  seat 4   -MH         Exercise 10    Exercise Name 10  mini squat  -MH      Cueing 10  Verbal  -MH      Sets 10  2  -MH      Reps 10  10  -MH      Additional Comments  blue swiss ball at back; holding L2 med ball  -         Exercise 11    Exercise Name 11  side step  -MH      Cueing 11  Verbal  -MH      Reps 11  3 laps  -      Additional Comments  RTB  -MH         Exercise 12    Exercise Name 12  TKE  -MH      Cueing 12  Verbal  -MH      Reps 12  10  -MH         Exercise 13    Exercise Name 13  DKTC + bridge  -MH      Cueing 13  Verbal;Demo  -MH      Reps 13  10  -MH   "    Time 13  green swiss ball  -         Exercise 14    Exercise Name 14  SL RDL  -      Cueing 14  Verbal  -      Reps 14  8  -      Time 14  --  -      Additional Comments  L2 med ball to chair  -        User Key  (r) = Recorded By, (t) = Taken By, (c) = Cosigned By    Initials Name Provider Type     Laura Guy, PT Physical Therapist                       PT OP Goals     Row Name 04/09/21 1200          PT Short Term Goals    STG Date to Achieve  04/16/21  -     STG 1  The pt will demonstrate IND and compliant with initial HEP focused on L knee mobility and quad stability.  -     STG 1 Progress  Met  -     STG 2  The pt will demonstrate L knee PROM to at least 0-120 to facilitate improved gait pattern.  -     STG 2 Progress  Ongoing;Progressing  -     STG 3  The pt will ambulate without AD and near normal gait pattern in clinic over even ground.  -     STG 3 Progress  Met  [facility]        Long Term Goals    LTG Date to Achieve  05/25/21  -     LTG 1  The pt will demonstrate IND and compliant with progressie HEP focused on return to PLOF and IND condition management.  -     LTG 1 Progress  Ongoing  -     LTG 2  The pt will score no more than 20% disability on the KOS to indicate improved perceived performance of ADLs.  -     LTG 2 Progress  Ongoing  -     LTG 3  The pt will demonstrate L knee AROM to at least 0-125 to facilitate improved stair navigation and gait pattern.  -     LTG 3 Progress  Ongoing  [facility]     LTG 4  The pt will resume reciprocal stair navigation to facilitate improved household navigation.  -     LTG 4 Progress  Ongoing  [facility]     LTG 5  The pt will ambulate without AD for 30 min over even/uneven ground walking outside with pain no greater than 2/10 to facilitate return to recreational activity performance and functional activity tolerance.  -     LTG 5 Progress  Ongoing  [facility]     LTG 6  The pt will perform a squat to 90 with pain no greater than 2/10 to  facilitate improved transitional positions and body mechanics when performing pt transfers at work.  -     LTG 6 Progress  Ongoing  -       User Key  (r) = Recorded By, (t) = Taken By, (c) = Cosigned By    Initials Name Provider Type    Laura Mchugh, PT Physical Therapist          Therapy Education  Education Details: Updated HEP Access Code: [email or web address]  Given: HEP, Symptoms/condition management  Program: Reinforced, Progressed  How Provided: Verbal, Demonstration (accessing online)  Provided to: Patient  Level of Understanding: Verbalized, Demonstrated              Time Calculation:   Start Time: 1219  Stop Time: 1257  Time Calculation (min): 38 min  Total Timed Code Minutes- PT: 38 minute(s)  Therapy Charges for Today     Code Description Service Date Service Provider Modifiers Qty    71022745422 HC PT THER PROC EA 15 MIN 4/9/2021 Laura Guy, PT GP 3                    Laura Guy PT  4/9/2021

## 2021-04-12 ENCOUNTER — IMMUNIZATION (OUTPATIENT)
Dept: VACCINE CLINIC | Facility: HOSPITAL | Age: 51
End: 2021-04-12

## 2021-04-12 DIAGNOSIS — Z23 IMMUNIZATION DUE: ICD-10-CM

## 2021-04-12 PROCEDURE — 0001A: CPT | Performed by: INTERNAL MEDICINE

## 2021-04-12 PROCEDURE — 91300 HC SARSCOV02 VAC 30MCG/0.3ML IM: CPT | Performed by: INTERNAL MEDICINE

## 2021-04-13 ENCOUNTER — HOSPITAL ENCOUNTER (OUTPATIENT)
Dept: PHYSICAL THERAPY | Facility: HOSPITAL | Age: 51
Setting detail: THERAPIES SERIES
Discharge: HOME | End: 2021-04-13

## 2021-04-13 ENCOUNTER — TELEPHONE (OUTPATIENT)
Dept: ORTHOPEDIC SURGERY | Facility: CLINIC | Age: 51
End: 2021-04-13

## 2021-04-13 DIAGNOSIS — R26.2 DIFFICULTY WALKING: ICD-10-CM

## 2021-04-13 DIAGNOSIS — M25.562 ACUTE PAIN OF LEFT KNEE: Primary | ICD-10-CM

## 2021-04-13 DIAGNOSIS — Z47.89 ORTHOPEDIC AFTERCARE: ICD-10-CM

## 2021-04-13 PROCEDURE — 97110 THERAPEUTIC EXERCISES: CPT

## 2021-04-13 NOTE — PROGRESS NOTES
Left Knee Scope follow Up       Patient: Nisha Beck        YOB: 1970      Chief Complaints: left knee pain      History of Present Illness: Pt is here f/u knee arthroscopy she is doing better still has some pain but overall doing better        Allergies:   Allergies   Allergen Reactions   • Celebrex [Celecoxib] Anaphylaxis       Medications:   Home Medications:  Current Outpatient Medications on File Prior to Visit   Medication Sig   • acetaminophen (Acetaminophen Extra Strength) 500 MG tablet    • Ascorbic Acid (VITAMIN C PO) Take  by mouth. HOLD PRIOR TO SURG   • Cholecalciferol (VITAMIN D3 PO) Take  by mouth. HOLD PRIOR TO SURG   • Collagen-Boron-Hyaluronic Acid 10-5-3.3 MG tablet Take  by mouth. HOLD PRIOR TO SURG   • diphenhydrAMINE-acetaminophen (TYLENOL PM)  MG tablet per tablet Take 1 tablet by mouth At Night As Needed.   • ELDERBERRY PO Take  by mouth. HOLD PRIOR TO SURG   • HYDROcodone-acetaminophen (NORCO) 5-325 MG per tablet Take 1 tablet by mouth Every 4 (Four) Hours As Needed for Severe Pain .   • meloxicam (MOBIC) 15 MG tablet Take 15 mg by mouth Daily. HOLD PRIOR TO SURG   • Misc Natural Products (Joint Health) capsule Take  by mouth. HOLD PRIOR TO SURG   • Multiple Vitamins-Minerals (ZINC PO) Take 50 mg by mouth. HOLD PRIOR TO SURG   • temazepam (RESTORIL) 30 MG capsule Take 30 mg by mouth every night at bedtime.   • TiZANidine (Zanaflex) 4 MG capsule Take 4 mg by mouth At Night As Needed.   • TURMERIC PO HOLD PRIOR TO SURG   • VITAMIN E PO Take  by mouth. HOLD PRIOR TO SURG     No current facility-administered medications on file prior to visit.     Current Medications:  Scheduled Meds:  Continuous Infusions:No current facility-administered medications for this visit.    PRN Meds:.          Physical Exam: 51 y.o. female  General Appearance:    Alert, cooperative, in no acute distress                 There were no vitals filed for this visit.   Patient is alert and oriented  ×3 no acute distress normal mood physical exam.  Physical exam of the knee, incisions looked good there is no erythema, calf is soft and non-tender.  No sign or sx of DVT      Assessment  S/P knee scope.  Overall doing well.  Exam is a little bit better quads are improving I do not think she is ready to go back to work I really would like her to take the next 3 weeks and get stronger and then return to work I will see her back in 3 weeks        Plan: Continue with strengthening, progression of activities continue with aggressive physical therapy I will see her back 3 weeks and hopefully return her to work at that time          Answers for HPI/ROS submitted by the patient on 4/14/2021  Please describe your symptoms.: Ff up post op knee arthroscopy with menisectomy  Have you had these symptoms before?: No  How long have you been having these symptoms?: Greater than 2 weeks  Please list any medications you are currently taking for this condition.: Meloxicam and Acetaminophen, Collagen and turmeric, Joint vitamin and d3  What is the primary reason for your visit?: Other

## 2021-04-13 NOTE — THERAPY TREATMENT NOTE
Outpatient Physical Therapy Ortho Treatment Note  [facility]     Patient Name: Nisha Beck  : 1970  MRN: [id number]  Today's Date: 2021      Visit Date: 2021    Visit Dx:    ICD-10-CM ICD-9-CM   1. Acute pain of left knee  M25.562 719.46   2. Difficulty walking  R26.2 719.7   3. Orthopedic aftercare  Z47.89 V54.9       Patient Active Problem List   Diagnosis   • Chronic pain of left knee        Past Medical History:   Diagnosis Date   • GERD (gastroesophageal reflux disease)    • History of herpes simplex infection     COLD SORE   • Insomnia    • Left knee pain         Past Surgical History:   Procedure Laterality Date   • CYST REMOVAL      right side of neck   • ESSURE TUBAL LIGATION     • KNEE ARTHROSCOPY Left 3/17/2021    Procedure: KNEE ARTHROSCOPY LEFT, DEBRIDEMENT PATELAR FEMORAL JOINT, PARTAL LATARAL MENISECTOMY;  Surgeon: Mildred Faulkner MD;  [place] [facility] [place] [facility] [place]  Service: [place]opedics;  Laterality: Left;                       PT Assessment/Plan     Row Name 21 1600          PT Assessment    Assessment Comments  Ms. Beck presents to therapy with 3/10 pain secondary to cleaning at home. Patient ambulating with near normal gait pattern and no AD. patient continues to benefit from reassurance on safety of mobility and knowing that her knee is healing properly. Progressed POC via addition of multi-planer lunges, STS this session. Patient tolerated all increases well. continues to benefit from skilled therapy in order to address impairments  -RS (r) AB (t) RS (c)        PT Plan    PT Plan Comments  consider quad burner and eccentric quad step downs  -RS (r) AB (t) RS (c)       User Key  (r) = Recorded By, (t) = Taken By, (c) = Cosigned By    Initials Name Provider Type    RS Lori Luciano, PT Physical Therapist    AB Naman Hamm, PT Student PT Student            OP Exercises     Row Name 21 1600             Subjective Comments    Subjective Comments   Patient reports that she is doing ok, still sore after cleaning at home  -RS (r) AB (t) RS (c)         Subjective Pain    Able to rate subjective pain?  yes  -RS (r) AB (t) RS (c)      Pre-Treatment Pain Level  2  -RS (r) AB (t) RS (c)         Total Minutes    47647 - PT Therapeutic Exercise Minutes  39  -RS (r) AB (t) RS (c)         Exercise 1    Exercise Name 1  heel rasies on step  -RS (r) AB (t) RS (c)      Cueing 1  Verbal;Demo  -RS (r) AB (t) RS (c)      Sets 1  1  -RS (r) AB (t) RS (c)      Reps 1  10  -RS (r) AB (t) RS (c)      Additional Comments  concentric/eccentric  -RS (r) AB (t) RS (c)         Exercise 2    Exercise Name 2  SLR  -RS (r) AB (t) RS (c)      Cueing 2  Verbal;Demo  -RS (r) AB (t) RS (c)      Sets 2  1e  -RS (r) AB (t) RS (c)      Reps 2  12/10  -RS (r) AB (t) RS (c)      Time 2  QS between  -RS (r) AB (t) RS (c)         Exercise 3    Exercise Name 3  small step up w/ leg   -RS (r) AB (t) RS (c)      Cueing 3  Verbal  -RS (r) AB (t) RS (c)      Sets 3  2  -RS (r) AB (t) RS (c)      Reps 3  10e  -RS (r) AB (t) RS (c)      Additional Comments  6in + foam  -RS (r) AB (t) RS (c)         Exercise 4    Exercise Name 4  HS stretch at step  -RS (r) AB (t) RS (c)      Cueing 4  Verbal  -RS (r) AB (t) RS (c)      Reps 4  3e  -RS (r) AB (t) RS (c)      Time 4  20sec  -RS (r) AB (t) RS (c)         Exercise 5    Exercise Name 5  LAQ + add  -RS (r) AB (t) RS (c)      Cueing 5  Verbal  -RS (r) AB (t) RS (c)      Reps 5  15  -RS (r) AB (t) RS (c)      Additional Comments  3#  -RS (r) AB (t) RS (c)         Exercise 6    Exercise Name 6  monster walk  -RS (r) AB (t) RS (c)      Cueing 6  Verbal;Demo  -RS (r) AB (t) RS (c)      Reps 6  3 laps  -RS (r) AB (t) RS (c)      Additional Comments  RTB  -RS (r) AB (t) RS (c)         Exercise 7    Exercise Name 7  Knee flexion stretch at stair  -RS (r) AB (t) RS (c)      Cueing 7  Verbal;Demo  -RS (r) AB (t) RS (c)      Sets 7  1  -RS (r) AB (t) RS (c)      Reps  7  3  -RS (r) AB (t) RS (c)      Time 7  20 sec  -RS (r) AB (t) RS (c)         Exercise 9    Exercise Name 9  recumbent bike  -RS (r) AB (t) RS (c)      Cueing 9  Verbal  -RS (r) AB (t) RS (c)      Time 9  5min  -RS (r) AB (t) RS (c)         Exercise 10    Exercise Name 10  mini squat  -RS (r) AB (t) RS (c)      Cueing 10  Verbal  -RS (r) AB (t) RS (c)      Sets 10  2  -RS (r) AB (t) RS (c)      Reps 10  10  -RS (r) AB (t) RS (c)      Additional Comments  blue swiss ball at back; holding L2 med ball  -RS (r) AB (t) RS (c)         Exercise 11    Exercise Name 11  side step  -RS (r) AB (t) RS (c)      Cueing 11  Verbal  -RS (r) AB (t) RS (c)      Reps 11  3 laps  -RS (r) AB (t) RS (c)      Additional Comments  RTB  -RS (r) AB (t) RS (c)         Exercise 14    Exercise Name 14  SL RDL  -RS (r) AB (t) RS (c)      Cueing 14  Verbal  -RS (r) AB (t) RS (c)      Reps 14  8  -RS (r) AB (t) RS (c)      Additional Comments  L3 med ball to chair  -RS (r) AB (t) RS (c)         Exercise 15    Exercise Name 15  STS from chair w/ staggered stance  -RS (r) AB (t) RS (c)      Cueing 15  Verbal;Demo  -RS (r) AB (t) RS (c)      Sets 15  1  -RS (r) AB (t) RS (c)      Reps 15  12  -RS (r) AB (t) RS (c)      Additional Comments  staggered stance LLE in back  -RS (r) AB (t) RS (c)         Exercise 16    Exercise Name 16  Lunge matrix F/L/B  -RS (r) AB (t) RS (c)      Cueing 16  Verbal;Demo  -RS (r) AB (t) RS (c)      Sets 16  1  -RS (r) AB (t) RS (c)      Reps 16  10e  -RS (r) AB (t) RS (c)      Additional Comments  focus on step and lean  -RS (r) AB (t) RS (c)         Exercise 17    Exercise Name 17  stair training  -RS (r) AB (t) RS (c)      Cueing 17  Verbal  -RS (r) AB (t) RS (c)      Additional Comments  focus on equal WB and avoidance of compensations  -RS (r) AB (t) RS (c)        User Key  (r) = Recorded By, (t) = Taken By, (c) = Cosigned By    Initials Name Provider Type    RS Lori Luciano, PT Physical Therapist    AB Hamm  Naman, PT Student PT Student                       PT OP Goals     Row Name 04/13/21 1600          PT Short Term Goals    STG Date to Achieve  04/16/21  -RS (r) AB (t) RS (c)     STG 1  The pt will demonstrate IND and compliant with initial HEP focused on L knee mobility and quad stability.  -RS (r) AB (t) RS (c)     STG 1 Progress  Met  -RS (r) AB (t) RS (c)     STG 2  The pt will demonstrate L knee PROM to at least 0-120 to facilitate improved gait pattern.  -RS (r) AB (t) RS (c)     STG 2 Progress  Ongoing;Progressing  -RS (r) AB (t) RS (c)     STG 3  The pt will ambulate without AD and near normal gait pattern in clinic over even ground.  -RS (r) AB (t) RS (c)     STG 3 Progress  Met  -RS (r) AB (t) RS (c)        Long Term Goals    LTG Date to Achieve  05/25/21  -RS (r) AB (t) RS (c)     LTG 1  The pt will demonstrate IND and compliant with progressie HEP focused on return to PLOF and IND condition management.  -RS (r) AB (t) RS (c)     LTG 1 Progress  Ongoing  -RS (r) AB (t) RS (c)     LTG 2  The pt will score no more than 20% disability on the KOS to indicate improved perceived performance of ADLs.  -RS (r) AB (t) RS (c)     LTG 2 Progress  Ongoing  -RS (r) AB (t) RS (c)     LTG 3  The pt will demonstrate L knee AROM to at least 0-125 to facilitate improved stair navigation and gait pattern.  -RS (r) AB (t) RS (c)     LTG 3 Progress  Ongoing  -RS (r) AB (t) RS (c)     LTG 4  The pt will resume reciprocal stair navigation to facilitate improved household navigation.  -RS (r) AB (t) RS (c)     LTG 4 Progress  Met  -RS (r) AB (t) RS (c)     LTG 4 Progress Comments  completes stairs with no compensations or reports of pain  -RS (r) AB (t) RS (c)     LTG 5  The pt will ambulate without AD for 30 min over even/uneven ground walking outside with pain no greater than 2/10 to facilitate return to recreational activity performance and functional activity tolerance.  -RS (r) AB (t) RS (c)     LTG 5 Progress  Ongoing   -RS (r) AB (t) RS (c)     LTG 6  The pt will perform a squat to 90 with pain no greater than 2/10 to facilitate improved transitional positions and body mechanics when performing pt transfers at work.  -RS (r) AB (t) RS (c)     LTG 6 Progress  Ongoing  -RS (r) AB (t) RS (c)       User Key  (r) = Recorded By, (t) = Taken By, (c) = Cosigned By    Initials Name Provider Type    RS Lori Luciano, PT Physical Therapist    Naman Ron, PT Student PT Student          Therapy Education  Education Details: reviewed HEP, educated on new exercises, reassurance on healing timeframes  Given: HEP, Pain management  Program: Reinforced, Progressed  How Provided: Verbal  Provided to: Patient  Level of Understanding: Verbalized              Time Calculation:   Start Time: (P) 1600  Stop Time: (P) 1645  Time Calculation (min): (P) 45 min  Total Timed Code Minutes- PT: (P) 40 minute(s)  Therapy Charges for Today     Code Description Service Date Service Provider Modifiers Qty    49002697383 HC PT THER PROC EA 15 MIN 4/13/2021 Naman Hamm, PT Student GP 3                    Naman Hamm PT Student  4/13/2021

## 2021-04-16 ENCOUNTER — OFFICE VISIT (OUTPATIENT)
Dept: ORTHOPEDIC SURGERY | Facility: CLINIC | Age: 51
End: 2021-04-16

## 2021-04-16 ENCOUNTER — HOSPITAL ENCOUNTER (OUTPATIENT)
Dept: PHYSICAL THERAPY | Facility: HOSPITAL | Age: 51
Setting detail: THERAPIES SERIES
Discharge: HOME | End: 2021-04-16

## 2021-04-16 VITALS — TEMPERATURE: 96.4 F | HEIGHT: 61 IN | BODY MASS INDEX: 25.68 KG/M2 | WEIGHT: 136 LBS

## 2021-04-16 DIAGNOSIS — M25.562 ACUTE PAIN OF LEFT KNEE: Primary | ICD-10-CM

## 2021-04-16 DIAGNOSIS — R26.2 DIFFICULTY WALKING: ICD-10-CM

## 2021-04-16 DIAGNOSIS — Z47.89 ORTHOPEDIC AFTERCARE: ICD-10-CM

## 2021-04-16 DIAGNOSIS — Z98.890 S/P ARTHROSCOPY OF KNEE: Primary | ICD-10-CM

## 2021-04-16 PROCEDURE — 97110 THERAPEUTIC EXERCISES: CPT

## 2021-04-16 PROCEDURE — 99024 POSTOP FOLLOW-UP VISIT: CPT | Performed by: ORTHOPAEDIC SURGERY

## 2021-04-16 RX ORDER — DIPHENHYDRAMINE HCL 25 MG
TABLET ORAL
COMMUNITY

## 2021-04-16 NOTE — THERAPY TREATMENT NOTE
Outpatient Physical Therapy Ortho Treatment Note  [facility]     Patient Name: Nisha Beck  : 1970  MRN: [id number]  Today's Date: 2021      Visit Date: 2021    Visit Dx:    ICD-10-CM ICD-9-CM   1. Acute pain of left knee  M25.562 719.46   2. Difficulty walking  R26.2 719.7   3. Orthopedic aftercare  Z47.89 V54.9       Patient Active Problem List   Diagnosis   • Chronic pain of left knee        Past Medical History:   Diagnosis Date   • GERD (gastroesophageal reflux disease)    • History of herpes simplex infection     COLD SORE   • Insomnia    • Left knee pain         Past Surgical History:   Procedure Laterality Date   • CYST REMOVAL      right side of neck   • ESSURE TUBAL LIGATION     • KNEE ARTHROSCOPY Left 3/17/2021    Procedure: KNEE ARTHROSCOPY LEFT, DEBRIDEMENT PATELAR FEMORAL JOINT, PARTAL LATARAL MENISECTOMY;  Surgeon: Mildred Faulkner MD;  Location: [facility] [place] [facility] [place]  Service: Orthopedics;  Laterality: Left;                       PT Assessment/Plan     Row Name 21 1443          PT Assessment    Assessment Comments  Ms. Beck presents to therapy with increased fatigue this date secondary to busy day thus far. Pt. had follow up with MD who has her off work for 3 more weeks to improve LE strength and endurance prior to next appointment at which time will likely be cleared to return to work. Pt. continues to demonstrate muscular fatigue with ther ex, however, tolerated progression of several exercises including adding airex to lunge matrix to challenge knee stability and proprioception, tolerated addition of eccentrtic step down and quad burner met with expected fatigue. Pt. remains good candidate for skilled PT.  -        PT Plan    PT Plan Comments  Consider leg press, standing clam vs. glute iso at wall  -       User Key  (r) = Recorded By, (t) = Taken By, (c) = Cosigned By    Initials Name Provider Type    Laura Mchugh, PT Physical Therapist            OP  "Exercises     Row Name 04/16/21 1400             Subjective Comments    Subjective Comments  I am very tired. I have been up since 9 AM and then had doctors appointment adn walked around [place]  -         Subjective Pain    Able to rate subjective pain?  yes  -MH      Pre-Treatment Pain Level  2  -         Total Minutes    68773 - PT Therapeutic Exercise Minutes  40  -         Exercise 1    Exercise Name 1  heel rasies on step  -MH      Cueing 1  Verbal;Demo  -      Sets 1  1  -MH      Reps 1  10  -MH         Exercise 2    Exercise Name 2  quad burner  -      Cueing 2  Verbal;Demo  -MH      Reps 2  10  -MH      Additional Comments  slight bend R knee  -         Exercise 3    Exercise Name 3  small step up w/ leg   -      Cueing 3  Verbal  -MH      Sets 3  2  -MH      Reps 3  10e  -MH      Additional Comments  6\" + airex  -         Exercise 4    Exercise Name 4  HS stretch at step  -      Cueing 4  Verbal  -      Reps 4  3e  -MH      Time 4  20sec  -         Exercise 5    Exercise Name 5  eccentric step down  -      Cueing 5  Verbal  -      Sets 5  2  -MH      Reps 5  10  -MH      Additional Comments  4\" step with heel tap  -         Exercise 6    Exercise Name 6  monster walk  -      Cueing 6  Verbal;Demo  -      Reps 6  3 laps  -      Additional Comments  GTB  -         Exercise 7    Exercise Name 7  Knee flexion stretch at stair  -      Cueing 7  Verbal;Demo  -      Sets 7  1  -MH      Reps 7  3  -MH      Time 7  20 sec  -         Exercise 9    Exercise Name 9  recumbent bike  -      Cueing 9  Verbal  -      Time 9  5min  -MH      Additional Comments  seat 4  -         Exercise 10    Exercise Name 10  mini squat  -      Cueing 10  Verbal  -      Sets 10  2  -MH      Reps 10  10  -MH      Additional Comments  blue swiss ball at back; arms extended L2 med ball   -         Exercise 11    Exercise Name 11  side step  -      Cueing 11  Verbal  -      Reps " 11  3 laps  -      Additional Comments  GTB  -         Exercise 14    Exercise Name 14  SL RDL  -      Cueing 14  Verbal  -      Reps 14  8  -      Additional Comments  L3 med ball to cone  -         Exercise 15    Exercise Name 15  --  -      Cueing 15  --  -      Sets 15  --  -      Reps 15  --  -         Exercise 16    Exercise Name 16  Lunge matrix F/L/B  -      Cueing 16  Verbal;Demo  -      Sets 16  1  -      Reps 16  5e  -      Additional Comments  stepping onto airex  -         Exercise 17    Exercise Name 17  --  -      Cueing 17  --  -        User Key  (r) = Recorded By, (t) = Taken By, (c) = Cosigned By    Initials Name Provider Type     Laura Guy, PT Physical Therapist                       PT OP Goals     Row Name 04/16/21 1400          PT Short Term Goals    STG Date to Achieve  04/16/21  -     STG 1  The pt will demonstrate IND and compliant with initial HEP focused on L knee mobility and quad stability.  -     STG 1 Progress  Met  -     STG 2  The pt will demonstrate L knee PROM to at least 0-120 to facilitate improved gait pattern.  -     STG 2 Progress  Ongoing;Progressing  -     STG 3  The pt will ambulate without AD and near normal gait pattern in clinic over even ground.  -     STG 3 Progress  Met  -        Long Term Goals    LTG Date to Achieve  05/25/21  -     LTG 1  The pt will demonstrate IND and compliant with progressie HEP focused on return to PLOF and IND condition management.  -     LTG 1 Progress  Ongoing  -     LTG 2  The pt will score no more than 20% disability on the KOS to indicate improved perceived performance of ADLs.  -     LTG 2 Progress  Ongoing  -     LTG 3  The pt will demonstrate L knee AROM to at least 0-125 to facilitate improved stair navigation and gait pattern.  -     LTG 3 Progress  Ongoing  -     LTG 4  The pt will resume reciprocal stair navigation to facilitate improved household navigation.   -     LTG 4 Progress  Met  -[facility] 5  The pt will ambulate without AD for 30 min over even/uneven ground walking outside with pain no greater than 2/10 to facilitate return to recreational activity performance and functional activity tolerance.  -     LTG 5 Progress  Ongoing;Progressing  -[facility] 5 Progress Comments  encouraged pt. to perorm walking reimen to improve tolerance for functional return to work  -     LTG 6  The pt will perform a squat to 90 with pain no greater than 2/10 to facilitate improved transitional positions and body mechanics when performing pt transfers at work.  -[facility] 6 Progress  Ongoing  -       User Key  (r) = Recorded By, (t) = Taken By, (c) = Cosigned By    Initials Name Provider Type     Laura Guy PT Physical Therapist          Therapy Education  Given: HEP  Program: Reinforced  How Provided: Verbal, Demonstration  Provided to: Patient  Level of Understanding: Verbalized, Demonstrated              Time Calculation:   Start Time: 1402  Stop Time: 1442  Time Calculation (min): 40 min  Total Timed Code Minutes- PT: 40 minute(s)  Therapy Charges for Today     Code Description Service Date Service Provider Modifiers Qty    21248999666 HC PT THER PROC EA 15 MIN 4/16/2021 Laura Guy, PT GP 3                    Laura Guy PT  4/16/2021

## 2021-04-20 ENCOUNTER — HOSPITAL ENCOUNTER (OUTPATIENT)
Dept: PHYSICAL THERAPY | Facility: HOSPITAL | Age: 51
Setting detail: THERAPIES SERIES
Discharge: HOME | End: 2021-04-20

## 2021-04-20 DIAGNOSIS — M25.562 ACUTE PAIN OF LEFT KNEE: Primary | ICD-10-CM

## 2021-04-20 DIAGNOSIS — Z47.89 ORTHOPEDIC AFTERCARE: ICD-10-CM

## 2021-04-20 DIAGNOSIS — R26.2 DIFFICULTY WALKING: ICD-10-CM

## 2021-04-20 PROCEDURE — 97110 THERAPEUTIC EXERCISES: CPT

## 2021-04-20 NOTE — THERAPY TREATMENT NOTE
Outpatient Physical Therapy Ortho Treatment Note  [facility]     Patient Name: Nisha Beck  : 1970  MRN: [id number]  Today's Date: 2021      Visit Date: 2021    Visit Dx:    ICD-10-CM ICD-9-CM   1. Acute pain of left knee  M25.562 719.46   2. Difficulty walking  R26.2 719.7   3. Orthopedic aftercare  Z47.89 V54.9       Patient Active Problem List   Diagnosis   • Chronic pain of left knee        Past Medical History:   Diagnosis Date   • GERD (gastroesophageal reflux disease)    • History of herpes simplex infection     COLD SORE   • Insomnia    • Left knee pain         Past Surgical History:   Procedure Laterality Date   • CYST REMOVAL      right side of neck   • ESSURE TUBAL LIGATION     • KNEE ARTHROSCOPY Left 3/17/2021    Procedure: KNEE ARTHROSCOPY LEFT, DEBRIDEMENT PATELAR FEMORAL JOINT, PARTAL LATARAL MENISECTOMY;  Surgeon: Mildred Faulkner MD;  Location: [facility] OR [facility] [place];  Service: Orthopedics;  Laterality: Left;                       PT Assessment/Plan     Row Name 21 1700          PT Assessment    Assessment Comments  Ms. Beck reports to skilled therapy with 3/10 pain from cleaning her home and HEP exercises prior to session. patient progressing with POC well via addition of reps and non-compliant surfaces for increased challenge. Patient showing fatigue with endurance of exercises as session progresses. added bosu ball squats for non-compliant surface challenge as well as a quad strengthening exercise. overall patient is improving well with activity progression at this time. patient continues to benefit from skilled therapy to address functional impairments and deficits.  -RS (r) AB (t) RS (c)        PT Plan    PT Plan Comments  consider 1 leg eccentric wall squats, trampoline toss on bosu.   -RS (r) AB (t) RS (c)       User Key  (r) = Recorded By, (t) = Taken By, (c) = Cosigned By    Initials Name Provider Type    RS Lori Luciano, PT Physical Therapist    AB Hamm  Naman, PT Student PT Student            OP Exercises     Row Name 04/20/21 1600             Subjective Comments    Subjective Comments  i am sore today, I did a lot of cleaning and my PT exerecises before I came. patient curious if she can get on the schedule for enxt week.  -RS (r) AB (t) RS (c)         Subjective Pain    Able to rate subjective pain?  yes  -RS (r) AB (t) RS (c)      Pre-Treatment Pain Level  3  -RS (r) AB (t) RS (c)         Total Minutes    65105 - PT Therapeutic Exercise Minutes  40  -RS (r) AB (t) RS (c)         Exercise 1    Exercise Name 1  heel rasies on step  -RS (r) AB (t) RS (c)      Cueing 1  Verbal;Demo  -RS (r) AB (t) RS (c)      Sets 1  1  -RS (r) AB (t) RS (c)      Reps 1  15  -RS (r) AB (t) RS (c)         Exercise 2    Exercise Name 2  quad burner  -RS (r) AB (t) RS (c)      Cueing 2  Verbal;Demo  -RS (r) AB (t) RS (c)      Sets 2  1  -RS (r) AB (t) RS (c)      Reps 2  10  -RS (r) AB (t) RS (c)      Additional Comments  slight bend R knee on foam  -RS (r) AB (t) RS (c)         Exercise 4    Exercise Name 4  HS stretch at step  -RS (r) AB (t) RS (c)      Cueing 4  Verbal  -RS (r) AB (t) RS (c)      Reps 4  3e  -RS (r) AB (t) RS (c)      Time 4  20sec  -RS (r) AB (t) RS (c)         Exercise 6    Exercise Name 6  monster walk  -RS (r) AB (t) RS (c)      Cueing 6  Verbal;Demo  -RS (r) AB (t) RS (c)      Reps 6  3 laps  -RS (r) AB (t) RS (c)      Additional Comments  GTB  -RS (r) AB (t) RS (c)         Exercise 7    Exercise Name 7  Knee flexion stretch at stair  -RS (r) AB (t) RS (c)      Cueing 7  Verbal;Demo  -RS (r) AB (t) RS (c)      Sets 7  1  -RS (r) AB (t) RS (c)      Reps 7  3  -RS (r) AB (t) RS (c)      Time 7  20 sec  -RS (r) AB (t) RS (c)         Exercise 9    Exercise Name 9  recumbent bike  -RS (r) AB (t) RS (c)      Cueing 9  Verbal  -RS (r) AB (t) RS (c)      Time 9  5min  -RS (r) AB (t) RS (c)         Exercise 10    Exercise Name 10  mini squat  -RS (r) AB (t) RS (c)       Cueing 10  Verbal  -RS (r) AB (t) RS (c)      Sets 10  2  -RS (r) AB (t) RS (c)      Reps 10  10  -RS (r) AB (t) RS (c)      Additional Comments  on bosu ball  -RS (r) AB (t) RS (c)         Exercise 11    Exercise Name 11  side step  -RS (r) AB (t) RS (c)      Cueing 11  Verbal  -RS (r) AB (t) RS (c)      Reps 11  3 laps  -RS (r) AB (t) RS (c)      Additional Comments  GTB  -RS (r) AB (t) RS (c)         Exercise 14    Exercise Name 14  SL RDL  -RS (r) AB (t) RS (c)      Cueing 14  Verbal  -RS (r) AB (t) RS (c)      Reps 14  8  -RS (r) AB (t) RS (c)      Additional Comments  L3 med ball to cone  -RS (r) AB (t) RS (c)         Exercise 15    Exercise Name 15  leg press  -RS (r) AB (t) RS (c)      Cueing 15  Verbal;Demo  -RS (r) AB (t) RS (c)      Sets 15  2  -RS (r) AB (t) RS (c)      Reps 15  15  -RS (r) AB (t) RS (c)      Additional Comments  single leg/double leg  60#  -RS (r) AB (t) RS (c)         Exercise 16    Exercise Name 16  Lunge matrix F/L/B  -RS (r) AB (t) RS (c)      Cueing 16  Verbal;Demo  -RS (r) AB (t) RS (c)      Sets 16  1  -RS (r) AB (t) RS (c)      Reps 16  10e  -RS (r) AB (t) RS (c)      Additional Comments  stepping onto airex  -RS (r) AB (t) RS (c)         Exercise 17    Exercise Name 17  standing clam shell at wall  -RS (r) AB (t) RS (c)      Cueing 17  Verbal;Demo  -RS (r) AB (t) RS (c)      Sets 17  1  -RS (r) AB (t) RS (c)      Reps 17  10e  -RS (r) AB (t) RS (c)      Additional Comments  YTB at knees, hand held support as needed  -RS (r) AB (t) RS (c)         Exercise 18    Exercise Name 18  standing hip abduction  -RS (r) AB (t) RS (c)      Cueing 18  Verbal;Demo  -RS (r) AB (t) RS (c)      Sets 18  1  -RS (r) AB (t) RS (c)      Reps 18  10e  -RS (r) AB (t) RS (c)      Additional Comments  YTB at ankles, cues for erect posture  -RS (r) AB (t) RS (c)        User Key  (r) = Recorded By, (t) = Taken By, (c) = Cosigned By    Initials Name Provider Type    RS Lori Luciano, PT Physical  Therapist    Naman Ron, PT Student PT Student                       PT OP Goals     Row Name 04/20/21 1700          PT Short Term Goals    STG Date to Achieve  04/16/21  -RS (r) AB (t) RS (c)     STG 1  The pt will demonstrate IND and compliant with initial HEP focused on L knee mobility and quad stability.  -RS (r) AB (t) RS (c)     STG 1 Progress  Met  -RS (r) AB (t) RS (c)     STG 2  The pt will demonstrate L knee PROM to at least 0-120 to facilitate improved gait pattern.  -RS (r) AB (t) RS (c)     STG 2 Progress  Ongoing;Progressing  -RS (r) AB (t) RS (c)     STG 3  The pt will ambulate without AD and near normal gait pattern in clinic over even ground.  -RS (r) AB (t) RS (c)     STG 3 Progress  Met  -RS (r) AB (t) RS (c)        Long Term Goals    LTG Date to Achieve  05/25/21  -RS (r) AB (t) RS (c)     LTG 1  The pt will demonstrate IND and compliant with progressie HEP focused on return to PLOF and IND condition management.  -RS (r) AB (t) RS (c)     LTG 1 Progress  Ongoing  -RS (r) AB (t) RS (c)     LTG 2  The pt will score no more than 20% disability on the KOS to indicate improved perceived performance of ADLs.  -RS (r) AB (t) RS (c)     LTG 2 Progress  Ongoing  -RS (r) AB (t) RS (c)     LTG 3  The pt will demonstrate L knee AROM to at least 0-125 to facilitate improved stair navigation and gait pattern.  -RS (r) AB (t) RS (c)     LTG 3 Progress  Ongoing  -RS (r) AB (t) RS (c)     LTG 4  The pt will resume reciprocal stair navigation to facilitate improved household navigation.  -RS (r) AB (t) RS (c)     LTG 4 Progress  Met  -RS (r) AB (t) RS (c)     LTG 5  The pt will ambulate without AD for 30 min over even/uneven ground walking outside with pain no greater than 2/10 to facilitate return to recreational activity performance and functional activity tolerance.  -RS (r) AB (t) RS (c)     LTG 5 Progress  Ongoing;Progressing  -RS (r) AB (t) RS (c)     LTG 6  The pt will perform a squat to 90 with  pain no greater than 2/10 to facilitate improved transitional positions and body mechanics when performing pt transfers at work.  -RS (r) AB (t) RS (c)     LTG 6 Progress  Ongoing  -RS (r) AB (t) RS (c)       User Key  (r) = Recorded By, (t) = Taken By, (c) = Cosigned By    Initials Name Provider Type    RS Lori Luciano, PT Physical Therapist    Naman Ron, PT Student PT Student          Therapy Education  Education Details: HEP, normal tissue healing times,   Given: HEP, Symptoms/condition management, Pain management, Posture/body mechanics, Mobility training  Program: Reinforced  How Provided: Verbal  Provided to: Patient  Level of Understanding: Verbalized              Time Calculation:   Start Time: 1647  Stop Time: 1730  Time Calculation (min): 43 min  Total Timed Code Minutes- PT: 40 minute(s)  Therapy Charges for Today     Code Description Service Date Service Provider Modifiers Qty    58863334022 HC PT THER PROC EA 15 MIN 4/20/2021 Naman Hamm, PT Student GP 3                    Naman Hamm PT Student  4/20/2021

## 2021-04-23 ENCOUNTER — HOSPITAL ENCOUNTER (OUTPATIENT)
Dept: PHYSICAL THERAPY | Facility: HOSPITAL | Age: 51
Setting detail: THERAPIES SERIES
Discharge: HOME | End: 2021-04-23

## 2021-04-23 DIAGNOSIS — R26.2 DIFFICULTY WALKING: ICD-10-CM

## 2021-04-23 DIAGNOSIS — Z47.89 ORTHOPEDIC AFTERCARE: ICD-10-CM

## 2021-04-23 DIAGNOSIS — M25.562 ACUTE PAIN OF LEFT KNEE: Primary | ICD-10-CM

## 2021-04-23 PROCEDURE — 97110 THERAPEUTIC EXERCISES: CPT

## 2021-04-23 NOTE — THERAPY TREATMENT NOTE
Outpatient Physical Therapy Ortho Treatment Note  [facility]     Patient Name: Nisha Beck  : 1970  MRN: [id number]  Today's Date: 2021      Visit Date: 2021    Visit Dx:    ICD-10-CM ICD-9-CM   1. Acute pain of left knee  M25.562 719.46   2. Difficulty walking  R26.2 719.7   3. Orthopedic aftercare  Z47.89 V54.9       Patient Active Problem List   Diagnosis   • Chronic pain of left knee        Past Medical History:   Diagnosis Date   • GERD (gastroesophageal reflux disease)    • History of herpes simplex infection     COLD SORE   • Insomnia    • Left knee pain         Past Surgical History:   Procedure Laterality Date   • CYST REMOVAL      right side of neck   • ESSURE TUBAL LIGATION     • KNEE ARTHROSCOPY Left 3/17/2021    Procedure: KNEE ARTHROSCOPY LEFT, DEBRIDEMENT PATELAR FEMORAL JOINT, PARTAL LATARAL MENISECTOMY;  Surgeon: Mildred Faulkner MD;  Location: [facility] OR [facility] [place];  Service: Orthopedics;  Laterality: Left;                       PT Assessment/Plan     Row Name 21 1529          PT Assessment    Assessment Comments  Ms. Beck continues to report minimal pain levels, continues to voice concern over healing/appropriate activities depsite reassurance for therapist that pt. is okay to resume normal activities. Proggressed ther ex adn added resisted forward walking/backpedal, wall sits with LAQ, and staggered stance squat to encourage weight shift onto L LE. Pt. continues to faitgue with ther ex though is improving with tolerance. Pt. demonstrates decreased single limb stability and requires encouragement for appropriate weight shift with squatting/SLS activities.  -        PT Plan    PT Plan Comments  continue to challenge dynamic stability  -       User Key  (r) = Recorded By, (t) = Taken By, (c) = Cosigned By    Initials Name Provider Type    Laura Mchugh, PT Physical Therapist            OP Exercises     Row Name 21 1400             Subjective Comments     Subjective Comments  The knee sleeve helps with some of that numbness  -         Subjective Pain    Able to rate subjective pain?  yes  -      Pre-Treatment Pain Level  3  -         Total Minutes    87563 - PT Therapeutic Exercise Minutes  39  -         Exercise 2    Exercise Name 2  quad burner  -      Cueing 2  Verbal;Demo  -      Sets 2  1  -MH      Reps 2  12e  -      Additional Comments  slight bent in R knee  -         Exercise 3    Exercise Name 3  staggered stance mini squat/STS  -      Cueing 3  Verbal;Demo  -      Reps 3  10  -      Time 3  attempted SL wall squat but pt. unable to perform  -      Additional Comments  L LE back, R LE heel out front  -         Exercise 9    Exercise Name 9  recumbent bike  -      Cueing 9  Verbal  -      Time 9  5 min  -      Additional Comments  seat 4  -         Exercise 14    Exercise Name 14  SL RDL  -      Cueing 14  Verbal  -      Reps 14  8  -      Additional Comments  L3 med ball to cone  -         Exercise 15    Exercise Name 15  leg press  -      Cueing 15  Verbal;[facility]      Sets 15  2  -      Reps 15  15  -      Additional Comments  DL 70#  -         Exercise 16    Exercise Name 16  Lunge matrix F/L/B  -      Cueing 16  Verbal;[facility]      Sets 16  1  -      Reps 16  10e  -      Time 16  L1 me dball at chest  -      Additional Comments  stepping onto airex  -         Exercise 17    Exercise Name 17  standing clam shell at wall  -      Cueing 17  Verbal;Demo  [facility]      Sets 17  1  -      Reps 17  12e  -      Additional Comments  YTB at knees  -         Exercise 18    Exercise Name 18  wall squat isometric  -      Cueing 18  Verbal  -      Reps 18  10e  -      Additional Comments  alt LAQ  -         Exercise 19    Exercise Name 19  backpedal with resistance  -      Cueing 19  Verbal  -      Reps 19  4 laps (1/3 long hallway)  -      Additional Comments  black TB at waist  -          Exercise 20    Exercise Name 20  forward resisted walking  -      Cueing 20  Verbal  -      Reps 20  4 laps (1/3 long hallway)  -      Additional Comments  black TB at waist  -        User Key  (r) = Recorded By, (t) = Taken By, (c) = Cosigned By    Initials Name Provider Type     Laura Guy, PT Physical Therapist                       PT OP Goals     Row Name 04/23/21 1500          PT Short Term Goals    STG Date to Achieve  04/16/21  -     STG 1  The pt will demonstrate IND and compliant with initial HEP focused on L knee mobility and quad stability.  -     STG 1 Progress  Met  -     STG 2  The pt will demonstrate L knee PROM to at least 0-120 to facilitate improved gait pattern.  -     STG 2 Progress  Ongoing;Progressing  -     STG 3  The pt will ambulate without AD and near normal gait pattern in clinic over even ground.  -     STG 3 Progress  Met  -        Long Term Goals    LTG Date to Achieve  05/25/21  -     LTG 1  The pt will demonstrate IND and compliant with progressie HEP focused on return to PLOF and IND condition management.  -     LTG 1 Progress  Ongoing  -     LTG 2  The pt will score no more than 20% disability on the KOS to indicate improved perceived performance of ADLs.  -     LTG 2 Progress  Ongoing  -     LTG 3  The pt will demonstrate L knee AROM to at least 0-125 to facilitate improved stair navigation and gait pattern.  -     LTG 3 Progress  Ongoing  -     LTG 4  The pt will resume reciprocal stair navigation to facilitate improved household navigation.  -     LTG 4 Progress  Met  -     LTG 5  The pt will ambulate without AD for 30 min over even/uneven ground walking outside with pain no greater than 2/10 to facilitate return to recreational activity performance and functional activity tolerance.  -     LTG 5 Progress  Ongoing;Progressing  -     LTG 6  The pt will perform a squat to 90 with pain no greater than 2/10 to facilitate  improved transitional positions and body mechanics when performing pt transfers at work.  -     LTG 6 Progress  Ongoing  -       User Key  (r) = Recorded By, (t) = Taken By, (c) = Cosigned By    Initials Name Provider Type    Laura Mchugh PT Physical Therapist          Therapy Education  Education Details: Appropriate progression of stationary bike at home  Given: HEP  Program: Reinforced  How Provided: Verbal, Demonstration  Provided to: Patient  Level of Understanding: Verbalized, Demonstrated              Time Calculation:   Start Time: 1447  Stop Time: 1527  Time Calculation (min): 40 min  Total Timed Code Minutes- PT: 39 minute(s)  Time Calculation- PT  Start Time: 1447  Stop Time: 1527  Time Calculation (min): 40 min  Total Timed Code Minutes- PT: 39 minute(s)  Timed Charges  31145 - PT Therapeutic Exercise Minutes: 39  Total Minutes  Timed Charges Total Minutes: 39   Total Minutes: 39  Therapy Charges for Today     Code Description Service Date Service Provider Modifiers Qty    [id number] HC PT THER PROC EA 15 MIN 4/23/2021 Laura Guy, PT GP 3                    Laura Guy PT  4/23/2021

## 2021-04-28 ENCOUNTER — HOSPITAL ENCOUNTER (OUTPATIENT)
Dept: PHYSICAL THERAPY | Facility: HOSPITAL | Age: 51
Setting detail: THERAPIES SERIES
Discharge: HOME | End: 2021-04-28

## 2021-04-28 DIAGNOSIS — R26.2 DIFFICULTY WALKING: ICD-10-CM

## 2021-04-28 DIAGNOSIS — M25.562 ACUTE PAIN OF LEFT KNEE: Primary | ICD-10-CM

## 2021-04-28 DIAGNOSIS — Z47.89 ORTHOPEDIC AFTERCARE: ICD-10-CM

## 2021-04-28 PROCEDURE — 97110 THERAPEUTIC EXERCISES: CPT

## 2021-04-28 NOTE — THERAPY PROGRESS REPORT/RE-CERT
Outpatient Physical Therapy Ortho Progress Note  [facility]     Patient Name: Nisha Beck  : 1970  MRN: [id number]  Today's Date: 2021      Visit Date: 2021    Visit Dx:    ICD-10-CM ICD-9-CM   1. Acute pain of left knee  M25.562 719.46   2. Difficulty walking  R26.2 719.7   3. Orthopedic aftercare  Z47.89 V54.9       Patient Active Problem List   Diagnosis   • Chronic pain of left knee        Past Medical History:   Diagnosis Date   • GERD (gastroesophageal reflux disease)    • History of herpes simplex infection     COLD SORE   • Insomnia    • Left knee pain         Past Surgical History:   Procedure Laterality Date   • CYST REMOVAL      right side of neck   • ESSURE TUBAL LIGATION     • KNEE ARTHROSCOPY Left 3/17/2021    Procedure: KNEE ARTHROSCOPY LEFT, DEBRIDEMENT PATELAR FEMORAL JOINT, PARTAL LATARAL MENISECTOMY;  Surgeon: Mildred Faulkner MD;  Location: [facility] OR [facility];  Service: Orthopedics;  Laterality: Left;       PT Ortho     Row Name 21 1100       Left Lower Ext    Lt Knee Extension/Flexion AROM  0-134  -RS       MMT Left Lower Ext    Lt Hip Flexion MMT, Gross Movement  (4+/5) good plus  -RS    Lt Hip ABduction MMT, Gross Movement  (4/5) good  -RS    Lt Knee Extension MMT, Gross Movement  (4+/5) good plus  -RS    Lt Knee Flexion MMT, Gross Movement  (4/5) good  -RS    Lt Ankle Plantarflexion MMT, Gross Movement  (4+/5) good plus  -RS    Lt Ankle Dorsiflexion MMT, Gross Movement  (5/5) normal  -RS      User Key  (r) = Recorded By, (t) = Taken By, (c) = Cosigned By    Initials Name Provider Type    RS Lori Luciano, PT Physical Therapist                      PT Assessment/Plan     Row Name 21 1200          PT Assessment    Assessment Comments  Ms. Beck has been seen by skilled PT s/p L knee arthroscopy, patellar femoral joint debridement, partial lateral meniscectomy on 3/17/21 and has met  3/3 STG and  2/6 LTG. She reports return to 75% of PLOF compared to  before surgery and reports pain is improved to 4/10 on average and no more than 6/10.  She tolerates therex performance well with min verbal cues for technique. Continue to note decreased single limb stability and some decrease in L quad and hamstring strength, although improved compared to initial eval. She remains appropriate for skilled PT.  -RS        PT Plan    PT Plan Comments  Continue 1x a week for 4 weeks focused on functional strength and return to work tolerance.  -RS       User Key  (r) = Recorded By, (t) = Taken By, (c) = Cosigned By    Initials Name Provider Type    RS Lori Luciano, PT Physical Therapist            OP Exercises     Row Name 04/28/21 1100             Subjective Comments    Subjective Comments  Pt reports feeling some better overall, pain ia 4/10 on average and 6/10 at highest. Feels 75% back to PLOF.  -RS         Subjective Pain    Able to rate subjective pain?  yes  -RS      Pre-Treatment Pain Level  4  -RS         Total Minutes    84840 - PT Therapeutic Exercise Minutes  40  -RS         Exercise 2    Exercise Name 2  quad burner  -RS      Cueing 2  Verbal;Demo  -RS      Sets 2  2  -RS      Reps 2  10 front/back  -RS      Time 2  foam  -RS      Additional Comments  slight bend in R knee  -RS         Exercise 3    Exercise Name 3  staggered stance mini squat/STS  -RS      Cueing 3  Verbal;Demo  -RS      Reps 3  15  -RS      Time 3  cue to put barely any weight on RLE  -RS      Additional Comments  L LE back, R LE heel out front  -RS         Exercise 8    Exercise Name 8  heel raise- up wo down one  -RS      Cueing 8  Verbal;Demo  -RS      Reps 8  15  -RS      Additional Comments  off step  -RS         Exercise 9    Exercise Name 9  recumbent bike  -RS      Cueing 9  Verbal  -RS      Time 9  5 min  -RS      Additional Comments  L2  -RS         Exercise 14    Exercise Name 14  SL RDL  -RS      Cueing 14  Verbal  -RS      Sets 14  2  -RS      Reps 14  10  -RS      Additional Comments   reaching for floor- intermittent UE assist  -RS         Exercise 15    Exercise Name 15  leg press  -RS      Cueing 15  Verbal;Demo  -RS      Sets 15  2  -RS      Reps 15  15  -RS      Additional Comments  , SL 40#  -RS         Exercise 16    Exercise Name 16  Lunge matrix F/L/B  -RS      Cueing 16  Verbal;Demo  -RS      Sets 16  1  -RS      Reps 16  10e  -RS      Time 16  --  -RS         Exercise 17    Exercise Name 17  standing clam shell at wall  -RS      Cueing 17  Verbal;Demo  -RS      Sets 17  1  -RS      Reps 17  12e  -RS      Additional Comments  RTB knees  -RS         Exercise 18    Exercise Name 18  wall squat isometric  -RS      Cueing 18  Verbal  -RS      Reps 18  10e  -RS         Exercise 19    Exercise Name 19  backpedal with resistance  -RS      Cueing 19  Verbal  -RS      Reps 19  4 laps (1/3 long hallway)  -RS      Additional Comments  black TB at waist  -RS         Exercise 20    Exercise Name 20  forward resisted walking  -RS      Cueing 20  Verbal  -RS      Reps 20  4 laps (1/3 long hallway)  -RS      Additional Comments  black TB at waist  -RS        User Key  (r) = Recorded By, (t) = Taken By, (c) = Cosigned By    Initials Name Provider Type    RS Lori Luciano, PT Physical Therapist                       PT OP Goals     Row Name 04/28/21 1100          PT Short Term Goals    STG Date to Achieve  04/16/21  -RS     STG 1  The pt will demonstrate IND and compliant with initial HEP focused on L knee mobility and quad stability.  -RS     STG 1 Progress  Met  -RS     STG 2  The pt will demonstrate L knee PROM to at least 0-120 to facilitate improved gait pattern.  -RS     STG 2 Progress  Met  -RS     STG 2 Progress Comments  0-134  -RS     STG 3  The pt will ambulate without AD and near normal gait pattern in clinic over even ground.  -RS     STG 3 Progress  Met  -RS        Long Term Goals    LTG Date to Achieve  05/25/21  -RS     LTG 1  The pt will demonstrate IND and compliant with  progressie HEP focused on return to PLOF and IND condition management.  -RS     LTG 1 Progress  Ongoing;Progressing  -RS     LTG 2  The pt will score no more than 20% disability on the KOS to indicate improved perceived performance of ADLs.  -RS     LTG 2 Progress  Ongoing;Progressing  -RS     LTG 2 Progress Comments  33%  -RS     LTG 3  The pt will demonstrate L knee AROM to at least 0-125 to facilitate improved stair navigation and gait pattern.  -RS     LTG 3 Progress  Met  -RS     LTG 3 Progress Comments  0-134  -RS     LTG 4  The pt will resume reciprocal stair navigation to facilitate improved household navigation.  -RS     LTG 4 Progress  Met  -RS     LTG 5  The pt will ambulate without AD for 30 min over even/uneven ground walking outside with pain no greater than 2/10 to facilitate return to recreational activity performance and functional activity tolerance.  -RS     LTG 5 Progress  Progressing  -RS     LTG 5 Progress Comments  pt reports 4/10 pain with 1 hour stand/walk  -RS     LTG 6  The pt will perform a squat to 90 with pain no greater than 2/10 to facilitate improved transitional positions and body mechanics when performing pt transfers at work.  -RS     LTG 6 Progress  Ongoing  -RS     LTG 6 Progress Comments  pain 4/10  -RS       User Key  (r) = Recorded By, (t) = Taken By, (c) = Cosigned By    Initials Name Provider Type    RS Lori Luciano PT Physical Therapist               Outcome Measure Options: Knee Outcome Score- ADL  Knee Outcome Score  Knee Outcome Score Comments: 33% ability      Time Calculation:   Start Time: 1145  Stop Time: 1230  Time Calculation (min): 45 min  Time Calculation- PT  Start Time: 1145  Stop Time: 1230  Time Calculation (min): 45 min  Timed Charges  96872 - PT Therapeutic Exercise Minutes: 40  Total Minutes  Timed Charges Total Minutes: 40   Total Minutes: 40  Therapy Charges for Today     Code Description Service Date Service Provider Modifiers Qty    44208186091  HC PT THER PROC EA 15 MIN 4/28/2021 Lori Luciano, PT GP 3          PT G-Codes  Outcome Measure Options: Knee Outcome Score- ADL         Lori Luciano, PT  4/28/2021

## 2021-05-03 ENCOUNTER — IMMUNIZATION (OUTPATIENT)
Dept: VACCINE CLINIC | Facility: HOSPITAL | Age: 51
End: 2021-05-03

## 2021-05-03 PROCEDURE — 0002A: CPT | Performed by: INTERNAL MEDICINE

## 2021-05-03 PROCEDURE — 91300 HC SARSCOV02 VAC 30MCG/0.3ML IM: CPT | Performed by: INTERNAL MEDICINE

## 2021-05-06 ENCOUNTER — HOSPITAL ENCOUNTER (OUTPATIENT)
Dept: PHYSICAL THERAPY | Facility: HOSPITAL | Age: 51
Setting detail: THERAPIES SERIES
Discharge: HOME | End: 2021-05-06

## 2021-05-06 DIAGNOSIS — M25.562 ACUTE PAIN OF LEFT KNEE: Primary | ICD-10-CM

## 2021-05-06 DIAGNOSIS — R26.2 DIFFICULTY WALKING: ICD-10-CM

## 2021-05-06 DIAGNOSIS — Z47.89 ORTHOPEDIC AFTERCARE: ICD-10-CM

## 2021-05-06 PROCEDURE — 97110 THERAPEUTIC EXERCISES: CPT

## 2021-05-06 NOTE — THERAPY TREATMENT NOTE
Outpatient Physical Therapy Ortho Treatment Note  [facility]     Patient Name: Nisha Beck  : 1970  MRN: [id number]  Today's Date: 2021      Visit Date: 2021    Visit Dx:    ICD-10-CM ICD-9-CM   1. Acute pain of left knee  M25.562 719.46   2. Difficulty walking  R26.2 719.7   3. Orthopedic aftercare  Z47.89 V54.9       Patient Active Problem List   Diagnosis   • Chronic pain of left knee        Past Medical History:   Diagnosis Date   • GERD (gastroesophageal reflux disease)    • History of herpes simplex infection     COLD SORE   • Insomnia    • Left knee pain         Past Surgical History:   Procedure Laterality Date   • CYST REMOVAL      right side of neck   • ESSURE TUBAL LIGATION     • KNEE ARTHROSCOPY Left 3/17/2021    Procedure: KNEE ARTHROSCOPY LEFT, DEBRIDEMENT PATELAR FEMORAL JOINT, PARTAL LATARAL MENISECTOMY;  Surgeon: Mildred Faulkner MD;  Location: [facility] OR [facility] [place];  Service: Orthopedics;  Laterality: Left;                       PT Assessment/Plan     Row Name 21 1100          PT Assessment    Assessment Comments  Ms. Beck reports feeling 85% ready to go back to work with decreased pain overall. Increased strengthing  focus this date with some increased resistance and focus on multiple sets (2x10-15 of eaach). Advised pt to decrease frequency of lateral hip strengthening as she report some increeased soreness R hip and educated about piriformis stretching, pt reports understading. Dhe is returning to work in a week, jyoti come to PT the day before, she continues to progress well toward remaining functional goals.  -RS        PT Plan    PT Plan Comments  1-2 more sessions prior to DC to HEP  -RS       User Key  (r) = Recorded By, (t) = Taken By, (c) = Cosigned By    Initials Name Provider Type    Lori Dumont, PT Physical Therapist            OP Exercises     Row Name 21 1000             Subjective Comments    Subjective Comments  Feeling better this week,  feels like she is getting closer to ready to back to work ad is limping less  -RS         Total Minutes    93836 - PT Therapeutic Exercise Minutes  40  -RS         Exercise 2    Exercise Name 2  quad burner  -RS      Cueing 2  Verbal;Demo  -RS      Sets 2  2  -RS      Reps 2  15 front/side/back  -RS      Time 2  foam  -RS      Additional Comments  slight bend in R knee  -RS         Exercise 3    Exercise Name 3  staggered stance mini squat/STS  -RS      Cueing 3  Verbal;Demo  -RS      Sets 3  2  -RS      Reps 3  10  -RS      Time 3  holding L3 med ball second set  -RS      Additional Comments  L LE back, R LE heel out front  -RS         Exercise 8    Exercise Name 8  heel raise- up wo down one  -RS      Cueing 8  Verbal;Demo  -RS      Sets 8  2  -RS      Reps 8  12  -RS         Exercise 9    Exercise Name 9  recumbent bike  -RS      Cueing 9  Verbal  -RS      Time 9  5 min  -RS      Additional Comments  L2  -RS         Exercise 13    Exercise Name 13  heel tap front  -RS      Cueing 13  Verbal;Demo  -RS      Sets 13  2  -RS      Reps 13  10  -RS      Additional Comments  4 inch  -RS         Exercise 14    Exercise Name 14  SL RDL  -RS      Cueing 14  Verbal  -RS      Sets 14  2  -RS      Reps 14  10  -RS         Exercise 15    Exercise Name 15  leg press  -RS      Cueing 15  Verbal;Demo  -RS      Sets 15  2  -RS      Reps 15  10  -RS      Time 15  each  -RS      Additional Comments  LLE 80#, BBLE 140#- cue for alignment  -RS         Exercise 16    Exercise Name 16  --  -RS      Cueing 16  --  -RS      Sets 16  --  -RS      Reps 16  --  -RS         Exercise 17    Exercise Name 17  standing clam shell at wall  -RS      Cueing 17  Verbal;Demo  -RS      Sets 17  1  -RS      Reps 17  12e  -RS      Additional Comments  RTB knees  -RS         Exercise 18    Exercise Name 18  wall squat   -RS      Cueing 18  Verbal  -RS      Reps 18  2x10 LAQ  -RS         Exercise 19    Exercise Name 19  demo of piriformis figure 4 stretch   -RS      Cueing 19  Verbal;Demo  -RS      Sets 19  3  -RS      Reps 19  --  -RS      Time 19  20  -RS         Exercise 20    Exercise Name 20  --  -RS      Cueing 20  --  -RS      Reps 20  --  -RS        User Key  (r) = Recorded By, (t) = Taken By, (c) = Cosigned By    Initials Name Provider Type    RS Lori Luciano, PT Physical Therapist                                          Time Calculation:   Start Time: 1034  Stop Time: 1115  Time Calculation (min): 41 min  Time Calculation- PT  Start Time: 1034  Stop Time: 1115  Time Calculation (min): 41 min  Timed Charges  48580 - PT Therapeutic Exercise Minutes: 40  Total Minutes  Timed Charges Total Minutes: 40   Total Minutes: 40  Therapy Charges for Today     Code Description Service Date Service Provider Modifiers Qty    34861909281 HC PT THER PROC EA 15 MIN 5/6/2021 Lori Luciano, PT GP 3                    Lori Luciano, PT  5/6/2021

## 2021-05-14 ENCOUNTER — HOSPITAL ENCOUNTER (OUTPATIENT)
Dept: PHYSICAL THERAPY | Facility: HOSPITAL | Age: 51
Setting detail: THERAPIES SERIES
Discharge: HOME | End: 2021-05-14

## 2021-05-14 DIAGNOSIS — R26.2 DIFFICULTY WALKING: ICD-10-CM

## 2021-05-14 DIAGNOSIS — M25.562 ACUTE PAIN OF LEFT KNEE: Primary | ICD-10-CM

## 2021-05-14 DIAGNOSIS — Z47.89 ORTHOPEDIC AFTERCARE: ICD-10-CM

## 2021-05-14 PROCEDURE — 97110 THERAPEUTIC EXERCISES: CPT

## 2021-05-21 ENCOUNTER — APPOINTMENT (OUTPATIENT)
Dept: PHYSICAL THERAPY | Facility: HOSPITAL | Age: 51
End: 2021-05-21

## 2021-05-24 ENCOUNTER — OFFICE VISIT (OUTPATIENT)
Dept: ORTHOPEDIC SURGERY | Facility: CLINIC | Age: 51
End: 2021-05-24

## 2021-05-24 VITALS — TEMPERATURE: 97.5 F | BODY MASS INDEX: 26.06 KG/M2 | HEIGHT: 61 IN | WEIGHT: 138 LBS

## 2021-05-24 DIAGNOSIS — Z98.890 S/P ARTHROSCOPY OF KNEE: Primary | ICD-10-CM

## 2021-05-24 PROCEDURE — 99024 POSTOP FOLLOW-UP VISIT: CPT | Performed by: ORTHOPAEDIC SURGERY

## 2021-08-19 ENCOUNTER — TELEMEDICINE (OUTPATIENT)
Dept: FAMILY MEDICINE CLINIC | Facility: TELEHEALTH | Age: 51
End: 2021-08-19

## 2021-08-19 DIAGNOSIS — Z20.822 CLOSE EXPOSURE TO COVID-19 VIRUS: Primary | ICD-10-CM

## 2021-08-19 PROCEDURE — BHEMPVIDEOVISIT: Performed by: NURSE PRACTITIONER

## 2021-08-19 RX ORDER — ALBUTEROL SULFATE 90 UG/1
2 AEROSOL, METERED RESPIRATORY (INHALATION) EVERY 4 HOURS PRN
Qty: 18 G | Refills: 0 | Status: SHIPPED | OUTPATIENT
Start: 2021-08-19

## 2021-08-19 RX ORDER — PREDNISONE 20 MG/1
20 TABLET ORAL 3 TIMES DAILY
Qty: 9 TABLET | Refills: 0 | Status: SHIPPED | OUTPATIENT
Start: 2021-08-19 | End: 2021-08-22

## 2021-08-20 NOTE — PATIENT INSTRUCTIONS

## 2021-08-20 NOTE — PROGRESS NOTES
SHAKA Beck is a 51 y.o. female  presents with complaint of wanting to know if she needs a covid test. She has asthma and has been having wheezing and SOA. Denies fever, loss of taste or smell    Has had a migraine headache but that is chronic for her; was relieved with motrin.    Nephew tested positive for Covid and she has been taking him to school. He started having symptoms 2 days ago.    Has had covid vaccine. Just now called [employer] [facility] and left a message.     Review of Systems   Constitutional: Negative for fever.   HENT: Negative for congestion, ear pain, rhinorrhea, sinus pain and sore throat.    Respiratory: Positive for shortness of breath and wheezing.    Neurological: Positive for headaches.       Past Medical History:   Diagnosis Date   • GERD (gastroesophageal reflux disease)    • History of herpes simplex infection     COLD SORE   • Insomnia    • Left knee pain        Family History   Problem Relation Age of Onset   • Stroke Mother    • Hypertension Mother    • Other Mother         GI bleed   • Heart disease Father    • Liver disease Father    • Diabetes Father    • Malig Hyperthermia Neg Hx        Social History     Socioeconomic History   • Marital status:      Spouse name: Not on file   • Number of children: Not on file   • Years of education: Not on file   • Highest education level: Not on file   Tobacco Use   • Smoking status: Former Smoker     Types: Cigarettes   • Smokeless tobacco: Never Used   • Tobacco comment: QUIT 6 YR AGO   Vaping Use   • Vaping Use: Never used   Substance and Sexual Activity   • Alcohol use: Yes     Comment: RARELY   • Drug use: Defer   • Sexual activity: Defer         There were no vitals taken for this visit.    PHYSICAL EXAM  Physical Exam   Constitutional: She appears well-developed and well-nourished.   HENT:   Head: Normocephalic.   Nose: Nose normal.   Neck: Neck normal appearance.  Pulmonary/Chest: Effort normal.   Neurological: She is alert.    Psychiatric: She has a normal mood and affect. Her speech is normal.       Diagnoses and all orders for this visit:    1. Close exposure to COVID-19 virus (Primary)  -     COVID-19,[facility] ROUTINE, NP/OP SWAB IN TRANSPORT MEDIA OR ESWAB 72 HR TAT - Swab, Nasopharynx; Future  -     QUESTIONNAIRE SERIES  -     predniSONE (DELTASONE) 20 MG tablet; Take 1 tablet by mouth 3 (Three) Times a Day for 3 days.  Dispense: 9 tablet; Refill: 0  -     albuterol sulfate  (90 Base) MCG/ACT inhaler; Inhale 2 puffs Every 4 (Four) Hours As Needed for Wheezing.  Dispense: 18 g; Refill: 0          FOLLOW-UP  As discussed during visit with [facility], if symptoms worsen or fail to improve, follow-up with PCP/Urgent Care/Emergency Department.    Patient verbalizes understanding of medications, instructions for treatment and follow-up.    Kanchan Pacheco, SHAWN  08/19/2021  21:05 EDT    This visit was performed via Telehealth.  This patient has been instructed to follow-up with their primary care provider if their symptoms worsen or the treatment provided does not resolve their illness.

## 2021-08-22 ENCOUNTER — TELEMEDICINE (OUTPATIENT)
Dept: FAMILY MEDICINE CLINIC | Facility: TELEHEALTH | Age: 51
End: 2021-08-22

## 2021-08-22 DIAGNOSIS — J06.9 UPPER RESPIRATORY TRACT INFECTION, UNSPECIFIED TYPE: ICD-10-CM

## 2021-08-22 DIAGNOSIS — Z20.822 EXPOSURE TO COVID-19 VIRUS: Primary | ICD-10-CM

## 2021-08-22 PROCEDURE — BHEMPVIDEOVISIT: Performed by: NURSE PRACTITIONER

## 2021-08-22 RX ORDER — BROMPHENIRAMINE MALEATE, PSEUDOEPHEDRINE HYDROCHLORIDE, AND DEXTROMETHORPHAN HYDROBROMIDE 2; 30; 10 MG/5ML; MG/5ML; MG/5ML
10 SYRUP ORAL 4 TIMES DAILY PRN
Qty: 280 ML | Refills: 0 | Status: SHIPPED | OUTPATIENT
Start: 2021-08-22

## 2021-08-22 NOTE — PROGRESS NOTES
Subjective   Chief Complaint   Patient presents with   • URI   • Exposure To Known Illness       Nisha Beck is a 51 y.o. female.     Pt is a [employer] employee. She was exposed to COVID from her nephew this past week, last contact was Tuesday 8/17/2021. She began having SOA, wheezing, HA on 8/18/2021, she has asthma and felt her symptoms may have been related to asthma. She was seen virtually on 8/19/2021, given prednisone and albuterol. She tested negative for COVID on 8/20/2021. The SOA and wheezing have resolved with the steroids, however she has now developed new symptoms today of congestion, rhinorrhea, sneezing, post nasal drainage, sinus pressure, sweating and fatigue. She failed her RTW screening and she would like guidance on what to do now. She has been fully vaccinated.     URI   This is a new problem. Episode onset: 4 days. The problem has been unchanged. There has been no fever. Associated symptoms include congestion, coughing, headaches, rhinorrhea, sinus pain, sneezing and a sore throat. Pertinent negatives include no abdominal pain, chest pain, diarrhea, dysuria, ear pain, joint pain, joint swelling, nausea, neck pain, plugged ear sensation, rash, swollen glands, vomiting or wheezing. Treatments tried: Albuterol, oral steroids. The treatment provided moderate relief.        Allergies   Allergen Reactions   • Celebrex [Celecoxib] Anaphylaxis       Past Medical History:   Diagnosis Date   • GERD (gastroesophageal reflux disease)    • History of herpes simplex infection     COLD SORE   • Insomnia    • Left knee pain        Past Surgical History:   Procedure Laterality Date   • CYST REMOVAL      right side of neck   • ESSURE TUBAL LIGATION     • KNEE ARTHROSCOPY Left 3/17/2021    Procedure: KNEE ARTHROSCOPY LEFT, DEBRIDEMENT PATELAR FEMORAL JOINT, PARTAL LATARAL MENISECTOMY;  Surgeon: Mildred Faulkner MD;  [place] [facility] [place] [facility] [place]  Service: [place]opedics;  Laterality: Left;       Social History      Socioeconomic History   • Marital status:      Spouse name: Not on file   • Number of children: Not on file   • Years of education: Not on file   • Highest education level: Not on file   Tobacco Use   • Smoking status: Former Smoker     Types: Cigarettes   • Smokeless tobacco: Never Used   • Tobacco comment: QUIT 6 YR AGO   Vaping Use   • Vaping Use: Never used   Substance and Sexual Activity   • Alcohol use: Yes     Comment: RARELY   • Drug use: Defer   • Sexual activity: Defer       Family History   Problem Relation Age of Onset   • Stroke Mother    • Hypertension Mother    • Other Mother         GI bleed   • Heart disease Father    • Liver disease Father    • Diabetes Father    • Malig Hyperthermia Neg Hx          Current Outpatient Medications:   •  acetaminophen (Acetaminophen Extra Strength) 500 MG tablet, , Disp: , Rfl:   •  albuterol sulfate  (90 Base) MCG/ACT inhaler, Inhale 2 puffs Every 4 (Four) Hours As Needed for Wheezing., Disp: 18 g, Rfl: 0  •  Ascorbic Acid (VITAMIN C PO), Take  by mouth. HOLD PRIOR TO SURG, Disp: , Rfl:   •  brompheniramine-pseudoephedrine-DM 30-2-10 MG/5ML syrup, Take 10 mL by mouth 4 (Four) Times a Day As Needed for Congestion, Cough or Allergies., Disp: 280 mL, Rfl: 0  •  Cholecalciferol (VITAMIN D3 PO), Take  by mouth. HOLD PRIOR TO SURG, Disp: , Rfl:   •  diphenhydrAMINE (Diphen) 25 MG tablet, , Disp: , Rfl:   •  ELDERBERRY PO, Take  by mouth. HOLD PRIOR TO SURG, Disp: , Rfl:   •  GLUCOSA-CHONDR-NA CHONDR-MSM PO, , Disp: , Rfl:   •  meloxicam (MOBIC) 15 MG tablet, Take 15 mg by mouth Daily. HOLD PRIOR TO SURG, Disp: , Rfl:   •  Multiple Vitamins-Minerals (ZINC PO), Take 50 mg by mouth. HOLD PRIOR TO SURG, Disp: , Rfl:   •  predniSONE (DELTASONE) 20 MG tablet, Take 1 tablet by mouth 3 (Three) Times a Day for 3 days., Disp: 9 tablet, Rfl: 0  •  temazepam (RESTORIL) 30 MG capsule, Take 30 mg by mouth every night at bedtime., Disp: , Rfl:   •  TURMERIC PO, HOLD  PRIOR TO SURG, Disp: , Rfl:   •  VITAMIN E PO, Take  by mouth. HOLD PRIOR TO SURG, Disp: , Rfl:       Review of Systems   Constitutional: Positive for diaphoresis and fatigue. Negative for chills.   HENT: Positive for congestion, postnasal drip, rhinorrhea, sinus pressure, sneezing and sore throat. Negative for ear pain and swollen glands.    Respiratory: Positive for cough. Negative for chest tightness, shortness of breath and wheezing.    Cardiovascular: Negative for chest pain.   Gastrointestinal: Negative for abdominal pain, diarrhea, nausea and vomiting.   Genitourinary: Negative for dysuria.   Musculoskeletal: Negative for joint pain, myalgias and neck pain.   Skin: Negative for rash.   Neurological: Positive for headache.        There were no vitals filed for this visit.    Objective   Physical Exam  Constitutional:       General: She is not in acute distress.     Appearance: She is ill-appearing. She is not toxic-appearing or diaphoretic.   HENT:      Head: Normocephalic and atraumatic.      Nose: Congestion and rhinorrhea present.      Right Sinus: Maxillary sinus tenderness and frontal sinus tenderness present.      Left Sinus: Maxillary sinus tenderness and frontal sinus tenderness present.      Comments: Per pt       Mouth/Throat:      Lips: Pink.      Mouth: Mucous membranes are moist.   Pulmonary:      Effort: Pulmonary effort is normal.   Neurological:      Mental Status: She is alert and oriented to person, place, and time.   Psychiatric:         Mood and Affect: Mood normal.         Speech: Speech normal.         Behavior: Behavior normal.          Procedures     Assessment/Plan   Diagnoses and all orders for this visit:    1. Exposure to COVID-19 virus (Primary)  -     COVID-19,[facility] ROUTINE, NP/OP SWAB IN TRANSPORT MEDIA OR ESWAB 72 HR TAT - Swab, Nasopharynx; Future    2. Upper respiratory tract infection, unspecified type  -     COVID-19,[facility] ROUTINE, NP/OP SWAB IN TRANSPORT MEDIA OR ESWAB  "72 HR TAT - Swab, Nasopharynx; Future  -     brompheniramine-pseudoephedrine-DM 30-2-10 MG/5ML syrup; Take 10 mL by mouth 4 (Four) Times a Day As Needed for Congestion, Cough or Allergies.  Dispense: 280 mL; Refill: 0      Due to your symptoms I have ordered a COVID-19 test for you to rule this out. Please go to your nearest [facility] for COVID-19 testing. Call before you arrive and let them know you have an order. We will call you with the results from a BLOCKED NUMBER, usually within 1-3 days.     SELF QUARANTINE until you meet the following criteria:   -It has been at least 10 days from the onset of your symptoms,   -A minimum of 24 hours fever free without fever reducing medicine   -AND improved symptoms   **Wear a cloth or surgical mask for 14 days or until symptoms have resolved**        For [facility] Employee's undergoing COVID-19 testing: Have your COVID test done within 24-48 hours of failing the screening tool. Contact [employer] at [phone] or [phone]. Leave a VM with your full name, employee ID, , exact details of symptoms or exposure. You will receive a call back within 24-72 hours with further info.     If your test is Negative: Complete the \"Return to Work Survey\" from [employer] (found on NATAC[employer]) to return to work.    If your test is Positive: Call [facility] immediately to inform of the positive result. Self-Quarantine until you are deemed no longer contagious (usually 10 days from symptom onset). Complete the \"Return to Work Survey\" found on [employer] to report your Positive test, then return within 24 hours of your anticipated return date in order to seek official clearance to return to work.    Take medicine as prescribed.   Take Tylenol for pain and/or fever, stay hydrated and rest.   If symptoms worsen or do not improve follow up with your PCP or visit your nearest Urgent Care Center or ER.    Results for orders placed or performed during the hospital encounter " of 08/20/21   COVID-19,[facility] ROUTINE, NP/OP SWAB IN TRANSPORT MEDIA OR ESWAB 72 HR TAT - Swab, Nasopharynx    Specimen: Nasopharynx; Swab   Result Value Ref Range    SARS-CoV-2, TEQUILA Not Detected Not Detected   SARS-CoV-2, TEQUILA 2 DAY TAT - Swab, Nasopharynx    Specimen: Nasopharynx; Swab   Result Value Ref Range    [facility] SARS-COV-2, TEQUILA 2 DAY TAT Performed        PLAN: Due to new symptoms, recommended pt be tested for COVID again. She should continue Quarantine for now. Advised to reach out to employee and leave a VM today and wait for a return call. Discussed dosing, side effects, recommended other symptomatic care.  Patient should follow up with primary care provider if symptoms worsen, fail to resolve or other symptoms need attention. Patient/family agree to the above.         SHAWN Hanks     This visit was performed via Telehealth.  This patient has been instructed to follow-up with their primary care provider if their symptoms worsen or the treatment provided does not resolve their illness.

## 2021-08-22 NOTE — PATIENT INSTRUCTIONS
"Due to your symptoms I have ordered a COVID-19 test for you to rule this out. Please go to your nearest [facility] for COVID-19 testing. Call before you arrive and let them know you have an order. We will call you with the results from a BLOCKED NUMBER, usually within 1-3 days.     SELF QUARANTINE until you meet the following criteria:   -It has been at least 10 days from the onset of your symptoms,   -A minimum of 24 hours fever free without fever reducing medicine   -AND improved symptoms   **Wear a cloth or surgical mask for 14 days or until symptoms have resolved**        For [facility] Employee's undergoing COVID-19 testing: Have your COVID test done within 24-48 hours of failing the screening tool. Contact [employer] at [phone] or [phone]. Leave a VM with your full name, employee ID, , exact details of symptoms or exposure. You will receive a call back within 24-72 hours with further info.     If your test is Negative: Complete the \"Return to Work Survey\" from [employer] (found on NATACHA) to return to work.    If your test is Positive: Call [employer] immediately to inform of the positive result. Self-Quarantine until you are deemed no longer contagious (usually 10 days from symptom onset). Complete the \"Return to Work Survey\" found on NATACHA to report your Positive test, then return within 24 hours of your anticipated return date in order to seek official clearance to return to work.    Take medicine as prescribed.   Take Tylenol for pain and/or fever, stay hydrated and rest.   If symptoms worsen or do not improve follow up with your PCP or visit your nearest Urgent Care Center or ER.        How to Quarantine at Home  Information for Patients and Families    These instructions are for people with confirmed or suspected COVID-19 who do not need to be hospitalized and those with confirmed COVID-19 who were hospitalized and discharged to care for themselves at home.    If you " were tested through the Health Department  The Health Department will monitor your wellbeing.  If it is determined that you do not need to be hospitalized and can be isolated at home, you will be monitored by staff from your local or state health department.     If you were tested through a Commercial Lab  You will need to monitor yourself and report changes in your symptoms to your doctor.  See the section below called Monitor Your Symptoms.    Follow these steps until a healthcare provider or local or state health department says you can return to your normal activities.    Stay home except to get medical care  • Restrict activities outside your home, except for getting medical care.   • Do not go to work, school, or public areas.   • Avoid using public transportation, ride-sharing, or taxis.    Separate yourself from other people and animals in your home  People  As much as possible, you should stay in a specific room and away from other people in your home. Also, you should use a separate bathroom, if available.    Animals  You should restrict contact with pets and other animals while you are sick with COVID-19, just like you would around other people. When possible, have another member of your household care for your animals while you are sick. If you are sick with COVID-19, avoid contact with your pet, including petting, snuggling, being kissed or licked, and sharing food. If you must care for your pet or be around animals while you are sick, wash your hands before and after you interact with pets and wear a facemask. See COVID-19 and Animals for more information.    Call ahead before visiting your doctor  If you have a medical appointment, call the healthcare provider and tell them that you have or may have COVID-19. This information will help the healthcare provider’s office take steps to keep other people from getting infected or exposed.    Wear a facemask  You should wear a facemask when you are around  other people (e.g., sharing a room or vehicle) or pets and before you enter a healthcare provider’s office.     If you are not able to wear a facemask (for example, because it causes trouble breathing), then people who live with you should not stay in the same room with you, or they should wear a facemask if they enter your room.    Cover your coughs and sneezes  • Cover your mouth and nose with a tissue when you cough or sneeze.   • Throw used tissues in a lined trash can.   • Immediately wash your hands with soap and water for at least 20 seconds or, if soap and water are not available, clean your hands with an alcohol-based hand  that contains at least 60% alcohol.    Clean your hands often  • Wash your hands often with soap and water for at least 20 seconds, especially after blowing your nose, coughing, or sneezing; going to the bathroom; and before eating or preparing food.     • If soap and water are not readily available, use an alcohol-based hand  with at least 60% alcohol, covering all surfaces of your hands and rubbing them together until they feel dry.    • Soap and water are the best option if hands are visibly dirty. Avoid touching your eyes, nose, and mouth with unwashed hands.    Avoid sharing personal household items  • You should not share dishes, drinking glasses, cups, eating utensils, towels, or bedding with other people or pets in your home.   • After using these items, they should be washed thoroughly with soap and water.    Clean all “high-touch” surfaces everyday  • High touch surfaces include counters, tabletops, doorknobs, bathroom fixtures, toilets, phones, keyboards, tablets, and bedside tables.   • Also, clean any surfaces that may have blood, stool, or body fluids on them.   • Use a household cleaning spray or wipe, according to the label instructions. Labels contain instructions for safe and effective use of the cleaning product, including precautions you should take  when applying the product, such as wearing gloves and making sure you have good ventilation during use of the product.    Monitor your symptoms  • Seek prompt medical attention if your illness is worsening (e.g., difficulty breathing).   • Before seeking care, call your healthcare provider and tell them that you have, or are being evaluated for, COVID-19.   • Put on a facemask before you enter the facility.     • These steps will help the healthcare provider’s office to keep other people in the office or waiting room from getting infected or exposed.   • Persons who are placed under active monitoring or facilitated self-monitoring should follow instructions provided by their local health department or occupational health professionals, as appropriate.  • If you have a medical emergency and need to call 911, notify the dispatch personnel that you have, or are being evaluated for COVID-19. If possible, put on a facemask before emergency medical services arrive.    Discontinuing home isolation  Patients with confirmed COVID-19 should remain under home isolation precautions until the risk of secondary transmission to others is thought to be low. The decision to discontinue home isolation precautions should be made on a case-by-case basis, in consultation with healthcare providers and state and local health departments.    The below content are for household members, intimate partners, and caregivers of a patient with symptomatic laboratory-confirmed COVID-19 or a patient under investigation:    Household members, intimate partners, and caregivers may have close contact with a person with symptomatic, laboratory-confirmed COVID-19 or a person under investigation.     Close contacts should monitor their health; they should call their healthcare provider right away if they develop symptoms suggestive of COVID-19 (e.g., fever, cough, shortness of breath)     Close contacts should also follow these recommendations:  • Make  sure that you understand and can help the patient follow their healthcare provider’s instructions for medication(s) and care. You should help the patient with basic needs in the home and provide support for getting groceries, prescriptions, and other personal needs.  • Monitor the patient’s symptoms. If the patient is getting sicker, call his or her healthcare provider and tell them that the patient has laboratory-confirmed COVID-19. This will help the healthcare provider’s office take steps to keep other people in the office or waiting room from getting infected. Ask the healthcare provider to call the local or [facility] health department for additional guidance. If the patient has a medical emergency and you need to call 911, notify the dispatch personnel that the patient has, or is being evaluated for COVID-19.  • Household members should stay in another room or be  from the patient as much as possible. Household members should use a separate bedroom and bathroom, if available.  • Prohibit visitors who do not have an essential need to be in the home.  • Household members should care for any pets in the home. Do not handle pets or other animals while sick.  For more information, see COVID-19 and Animals.  • Make sure that shared spaces in the home have good air flow, such as by an air conditioner or an opened window, weather permitting.  • Perform hand hygiene frequently. Wash your hands often with soap and water for at least 20 seconds or use an alcohol-based hand  that contains 60 to 95% alcohol, covering all surfaces of your hands and rubbing them together until they feel dry. Soap and water should be used preferentially if hands are visibly dirty.  • Avoid touching your eyes, nose, and mouth with unwashed hands.  • The patient should wear a facemask when you are around other people. If the patient is not able to wear a facemask (for example, because it causes trouble breathing), you, as the  caregiver, should wear a mask when you are in the same room as the patient.  • Wear a disposable facemask and gloves when you touch or have contact with the patient’s blood, stool, or body fluids, such as saliva, sputum, nasal mucus, vomit, or urine.   o Throw out disposable facemasks and gloves after using them. Do not reuse.  o When removing personal protective equipment, first remove and dispose of gloves. Then, immediately clean your hands with soap and water or alcohol-based hand . Next, remove and dispose of facemask, and immediately clean your hands again with soap and water or alcohol-based hand .  • Avoid sharing household items with the patient. You should not share dishes, drinking glasses, cups, eating utensils, towels, bedding, or other items. After the patient uses these items, you should wash them thoroughly (see below “Wash laundry thoroughly”).  • Clean all “high-touch” surfaces, such as counters, tabletops, doorknobs, bathroom fixtures, toilets, phones, keyboards, tablets, and bedside tables, every day. Also, clean any surfaces that may have blood, stool, or body fluids on them.   o Use a household cleaning spray or wipe, according to the label instructions. Labels contain instructions for safe and effective use of the cleaning product including precautions you should take when applying the product, such as wearing gloves and making sure you have good ventilation during use of the product.  • Wash laundry thoroughly.   o Immediately remove and wash clothes or bedding that have blood, stool, or body fluids on them.  o Wear disposable gloves while handling soiled items and keep soiled items away from your body. Clean your hands (with soap and water or an alcohol-based hand ) immediately after removing your gloves.  o Read and follow directions on labels of laundry or clothing items and detergent. In general, using a normal laundry detergent according to washing machine  instructions and dry thoroughly using the warmest temperatures recommended on the clothing label.  • Place all used disposable gloves, facemasks, and other contaminated items in a lined container before disposing of them with other household waste. Clean your hands (with soap and water or an alcohol-based hand ) immediately after handling these items. Soap and water should be used preferentially if hands are visibly dirty.  • Discuss any additional questions with your state or local health department or healthcare provider.    Adapted from information provided by the Centers for Disease Control and Prevention.  For more information, visit https://www.cdc.gov/coronavirus/2019-ncov/hcp/guidance-prevent-spread.html10 Things You Can Do to Manage Your COVID-19 Symptoms at Home  If you have possible or confirmed COVID-19:  1. Stay home from work and school. And stay away from other public places. If you must go out, avoid using any kind of public transportation, ridesharing, or taxis.  2. Monitor your symptoms carefully. If your symptoms get worse, call your healthcare provider immediately.  3. Get rest and stay hydrated.  4. If you have a medical appointment, call the healthcare provider ahead of time and tell them that you have or may have COVID-19.  5. For medical emergencies, call 911 and notify the dispatch personnel that you have or may have COVID-19.  6. Cover your cough and sneezes with a tissue or use the inside of your elbow.  7. Wash your hands often with soap and water for at least 20 seconds or clean your hands with an alcohol-based hand  that contains at least 60% alcohol.  8. As much as possible, stay in a specific room and away from other people in your home. Also, you should use a separate bathroom, if available. If you need to be around other people in or outside of the home, wear a mask.  9. Avoid sharing personal items with other people in your household, like dishes, towels, and  bedding.  10. Clean all surfaces that are touched often, like counters, tabletops, and doorknobs. Use household cleaning sprays or wipes according to the label instructions.  cdc.gov/coronavirus  07/01/2020  This information is not intended to replace advice given to you by your health care provider. Make sure you discuss any questions you have with your health care provider.  Document Revised: 04/15/2021 Document Reviewed: 04/15/2021  Elsevier Patient Education © 2021 [employer]  Viral Respiratory Infection  A respiratory infection is an illness that affects part of the respiratory system, such as the lungs, nose, or throat. A respiratory infection that is caused by a virus is called a viral respiratory infection.  Common types of viral respiratory infections include:  · A cold.  · The flu (influenza).  · A respiratory syncytial virus (RSV) infection.  What are the causes?  This condition is caused by a virus.  What are the signs or symptoms?  Symptoms of this condition include:  · A stuffy or runny nose.  · Yellow or green nasal discharge.  · A cough.  · Sneezing.  · Fatigue.  · Achy muscles.  · A sore throat.  · Sweating or chills.  · A fever.  · A headache.  How is this diagnosed?  This condition may be diagnosed based on:  · Your symptoms.  · A physical exam.  · Testing of nasal swabs.  How is this treated?  This condition may be treated with medicines, such as:  · Antiviral medicine. This may shorten the length of time a person has symptoms.  · Expectorants. These make it easier to cough up mucus.  · Decongestant nasal sprays.  · Acetaminophen or NSAIDs to relieve fever and pain.  Antibiotic medicines are not prescribed for viral infections. This is because antibiotics are designed to kill bacteria. They are not effective against viruses.  Follow these instructions at home:    Managing pain and congestion  · Take over-the-counter and prescription medicines only as told by your health care provider.  · If  you have a sore throat, gargle with a salt-water mixture 3-4 times a day or as needed. To make a salt-water mixture, completely dissolve ½-1 tsp of salt in 1 cup of warm water.  · Use nose drops made from salt water to ease congestion and soften raw skin around your nose.  · Drink enough fluid to keep your urine pale yellow. This helps prevent dehydration and helps loosen up mucus.  General instructions  · Rest as much as possible.  · Do not drink alcohol.  · Do not use any products that contain nicotine or tobacco, such as cigarettes and e-cigarettes. If you need help quitting, ask your health care provider.  · Keep all follow-up visits as told by your health care provider. This is important.  How is this prevented?    · Get an annual flu shot. You may get the flu shot in late summer, fall, or winter. Ask your health care provider when you should get your flu shot.  · Avoid exposing others to your respiratory infection.  ? Stay home from work or school as told by your health care provider.  ? Wash your hands with soap and water often, especially after you cough or sneeze. If soap and water are not available, use alcohol-based hand .  · Avoid contact with people who are sick during cold and flu season. This is generally fall and winter.  Contact a health care provider if:  · Your symptoms last for 10 days or longer.  · Your symptoms get worse over time.  · You have a fever.  · You have severe sinus pain in your face or forehead.  · The glands in your jaw or neck become very swollen.  Get help right away if you:  · Feel pain or pressure in your chest.  · Have shortness of breath.  · Faint or feel like you will faint.  · Have severe and persistent vomiting.  · Feel confused or disoriented.  Summary  · A respiratory infection is an illness that affects part of the respiratory system, such as the lungs, nose, or throat. A respiratory infection that is caused by a virus is called a viral respiratory  infection.  · Common types of viral respiratory infections are a cold, influenza, and respiratory syncytial virus (RSV) infection.  · Symptoms of this condition include a stuffy or runny nose, cough, sneezing, fatigue, achy muscles, sore throat, and fevers or chills.  · Antibiotic medicines are not prescribed for viral infections. This is because antibiotics are designed to kill bacteria. They are not effective against viruses.  This information is not intended to replace advice given to you by your health care provider. Make sure you discuss any questions you have with your health care provider.  Document Revised: 12/26/2019 Document Reviewed: 01/28/2019  [employer] Patient Education © 2021 [employer] Inc.

## 2021-08-23 PROBLEM — K21.9 GERD (GASTROESOPHAGEAL REFLUX DISEASE): Status: ACTIVE | Noted: 2021-08-23

## 2021-10-06 ENCOUNTER — TRANSCRIBE ORDERS (OUTPATIENT)
Dept: ADMINISTRATIVE | Facility: HOSPITAL | Age: 51
End: 2021-10-06

## 2021-10-06 DIAGNOSIS — Z12.31 VISIT FOR SCREENING MAMMOGRAM: Primary | ICD-10-CM

## 2021-12-07 ENCOUNTER — HOSPITAL ENCOUNTER (OUTPATIENT)
Dept: MAMMOGRAPHY | Facility: HOSPITAL | Age: 51
Discharge: HOME | End: 2021-12-07
Admitting: FAMILY MEDICINE

## 2021-12-07 DIAGNOSIS — Z12.31 VISIT FOR SCREENING MAMMOGRAM: ICD-10-CM

## 2021-12-07 PROCEDURE — 77063 BREAST TOMOSYNTHESIS BI: CPT

## 2021-12-07 PROCEDURE — 77067 SCR MAMMO BI INCL CAD: CPT

## 2021-12-09 ENCOUNTER — TELEMEDICINE (OUTPATIENT)
Dept: FAMILY MEDICINE CLINIC | Facility: TELEHEALTH | Age: 51
End: 2021-12-09

## 2021-12-09 DIAGNOSIS — R49.0 HOARSENESS: ICD-10-CM

## 2021-12-09 DIAGNOSIS — Z20.822 SUSPECTED COVID-19 VIRUS INFECTION: Primary | ICD-10-CM

## 2021-12-09 DIAGNOSIS — R09.89 CHEST CONGESTION: ICD-10-CM

## 2021-12-09 DIAGNOSIS — J02.9 SORE THROAT: ICD-10-CM

## 2021-12-09 PROCEDURE — BHEMPVIDEOVISIT: Performed by: NURSE PRACTITIONER

## 2021-12-09 NOTE — PROGRESS NOTES
You have chosen to receive care through a telehealth visit.  Do you consent to use a video/audio connection for your medical care today? Yes     CHIEF COMPLAINT  Chief Complaint   Patient presents with   • Sore Throat   • Hoarse   • URI         HPI  Nisha Beck is a 51 y.o. female  presents with complaint of 2 day h/o sore throat, hoarseness, chest congestion and post nasal drainage. She is fully vaccinated against covid 19 and had covid in August of this year. 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 She reports no fever or cough. She needs a covid test today to rule out covid and be able to RTW.    Review of Systems   Constitutional: Negative.    HENT: Positive for congestion (chest and nose), postnasal drip and sore throat. Negative for sinus pressure and sinus pain.    Respiratory: Negative for choking, shortness of breath and wheezing.    Gastrointestinal: Negative.    Musculoskeletal: Negative.    Skin: Negative.    Neurological: Negative.    Hematological: Negative.    Psychiatric/Behavioral: Negative.        Past Medical History:   Diagnosis Date   • Fibrocystic breast    • GERD (gastroesophageal reflux disease)    • History of herpes simplex infection     COLD SORE   • Insomnia    • Left knee pain        Family History   Problem Relation Age of Onset   • Stroke Mother    • Hypertension Mother    • Other Mother         GI bleed   • Heart disease Father    • Liver disease Father    • Diabetes Father    • Leukemia Paternal Aunt    • Malig Hyperthermia Neg Hx        Social History     Socioeconomic History   • Marital status:    Tobacco Use   • Smoking status: Former Smoker     Types: Cigarettes   • Smokeless tobacco: Never Used   • Tobacco comment: QUIT 6 YR AGO   Vaping Use   • Vaping Use: Never used   Substance and Sexual Activity   • Alcohol use: Yes     Comment: RARELY   • Drug use: Defer   • Sexual activity: Defer         There were no vitals taken for this visit.    PHYSICAL  EXAM  Physical Exam   Constitutional: She is oriented to person, place, and time. She appears well-developed and well-nourished. She does not have a sickly appearance. She does not appear ill. No distress.   HENT:   Head: Normocephalic and atraumatic.   Right Ear: Hearing normal.   Left Ear: Hearing normal.   Nose: Nose normal. Right sinus exhibits no maxillary sinus tenderness and no frontal sinus tenderness. Left sinus exhibits no maxillary sinus tenderness and no frontal sinus tenderness.   Mouth/Throat: Mouth/Lips are normal.  Patient  Reports throat is mildly red with out exudate or swelling.    Pulmonary/Chest: Effort normal.  No respiratory distress.  Neurological: She is alert and oriented to person, place, and time.   Psychiatric: She has a normal mood and affect.   Vitals reviewed.      Results for orders placed or performed in visit on 09/20/21   TSPOT    Specimen: Blood   Result Value Ref Range    TSPOTTB Positive (A) Negative    T-SPOT Panel A 17     T-SPOT Panel B 2     TSPOT NIL CONTROL INTERP Passed     TSPOT POS CONTROL INTERP Passed        Diagnoses and all orders for this visit:    1. Suspected COVID-19 virus infection (Primary)  -     COVID-19,APTIMA PANTHER(TEQUILA),[facility], NP/OP SWAB IN UTM/VTM/[facility] [employer] MEDIA,24 HR TAT - Swab, Nasopharynx; Future    2. Sore throat    3. Hoarseness    4. Chest congestion    Quarantine until results available.   covid test pending.   Reviewed quarantine protocol with patient.   Instructed to notify Employee health of results.     Warm salt water gargles prn and rest voice.   Tylenol as directed prn sore throat pain.         FOLLOW-UP  As discussed during visit with PCP/[facility] if no improvement or Urgent Care/Emergency Department if worsening of symptoms    Patient verbalizes understanding of medication dosage, comfort measures, instructions for treatment and follow-up.    Jackelyn Forrester, APRN  12/09/2021  14:00 EST    This visit was performed via  Telehealth.  This patient has been instructed to follow-up with their primary care provider if their symptoms worsen or the treatment provided does not resolve their illness.

## 2021-12-09 NOTE — PATIENT INSTRUCTIONS
Pharyngitis    Pharyngitis is a sore throat (pharynx). This is when there is redness, pain, and swelling in your throat. Most of the time, this condition gets better on its own. In some cases, you may need medicine.  Follow these instructions at home:  · Take over-the-counter and prescription medicines only as told by your doctor.  ? If you were prescribed an antibiotic medicine, take it as told by your doctor. Do not stop taking the antibiotic even if you start to feel better.  ? Do not give children aspirin. Aspirin has been linked to Reye syndrome.  · Drink enough water and fluids to keep your pee (urine) clear or pale yellow.  · Get a lot of rest.  · Rinse your mouth (gargle) with a salt-water mixture 3-4 times a day or as needed. To make a salt-water mixture, completely dissolve ½-1 tsp of salt in 1 cup of warm water.  · If your doctor approves, you may use throat lozenges or sprays to soothe your throat.  Contact a doctor if:  · You have large, tender lumps in your neck.  · You have a rash.  · You cough up green, yellow-brown, or bloody spit.  Get help right away if:  · You have a stiff neck.  · You drool or cannot swallow liquids.  · You cannot drink or take medicines without throwing up.  · You have very bad pain that does not go away with medicine.  · You have problems breathing, and it is not from a stuffy nose.  · You have new pain and swelling in your knees, ankles, wrists, or elbows.  Summary  · Pharyngitis is a sore throat (pharynx). This is when there is redness, pain, and swelling in your throat.  · If you were prescribed an antibiotic medicine, take it as told by your doctor. Do not stop taking the antibiotic even if you start to feel better.  · Most of the time, pharyngitis gets better on its own. Sometimes, you may need medicine.  This information is not intended to replace advice given to you by your health care provider. Make sure you discuss any questions you have with your health care  provider.  Document Revised: 11/30/2018 Document Reviewed: 01/23/2018  [employer] Patient Education © 2021 [employer]    10 Things You Can Do to Manage Your COVID-19 Symptoms at Home  If you have possible or confirmed COVID-19:  1. Stay home except to get medical care.  2. Monitor your symptoms carefully. If your symptoms get worse, call your healthcare provider immediately.  3. Get rest and stay hydrated.  4. If you have a medical appointment, call the healthcare provider ahead of time and tell them that you have or may have COVID-19.  5. For medical emergencies, call 911 and notify the dispatch personnel that you have or may have COVID-19.  6. Cover your cough and sneezes with a tissue or use the inside of your elbow.  7. Wash your hands often with soap and water for at least 20 seconds or clean your hands with an alcohol-based hand  that contains at least 60% alcohol.  8. As much as possible, stay in a specific room and away from other people in your home. Also, you should use a separate bathroom, if available. If you need to be around other people in or outside of the home, wear a mask.  9. Avoid sharing personal items with other people in your household, like dishes, towels, and bedding.  10. Clean all surfaces that are touched often, like counters, tabletops, and doorknobs. Use household cleaning sprays or wipes according to the label instructions.  cdc.gov/coronavirus  07/16/2021  This information is not intended to replace advice given to you by your health care provider. Make sure you discuss any questions you have with your health care provider.  Document Revised: 08/04/2021 Document Reviewed: 08/04/2021  [employer] Patient Education © 2021 [employer]    How to Quarantine at Home  Information for Patients and Families    These instructions are for people with confirmed or suspected COVID-19 who do not need to be hospitalized and those with confirmed COVID-19 who were hospitalized and discharged  to care for themselves at home.    If you were tested through the Health Department  The Health Department will monitor your wellbeing.  If it is determined that you do not need to be hospitalized and can be isolated at home, you will be monitored by staff from your local or state health department.     If you were tested through a Commercial [facility]  You will need to monitor yourself and report changes in your symptoms to your doctor.  See the section below called Monitor Your Symptoms.    Follow these steps until a healthcare provider or local or state health department says you can return to your normal activities.    Stay home except to get medical care  • Restrict activities outside your home, except for getting medical care.   • Do not go to work, school, or public areas.   • Avoid using public transportation, ride-sharing, or taxis.    Separate yourself from other people and animals in your home  People  As much as possible, you should stay in a specific room and away from other people in your home. Also, you should use a separate bathroom, if available.    Animals  You should restrict contact with pets and other animals while you are sick with COVID-19, just like you would around other people. When possible, have another member of your household care for your animals while you are sick. If you are sick with COVID-19, avoid contact with your pet, including petting, snuggling, being kissed or licked, and sharing food. If you must care for your pet or be around animals while you are sick, wash your hands before and after you interact with pets and wear a facemask. See COVID-19 and Animals for more information.    Call ahead before visiting your doctor  If you have a medical appointment, call the healthcare provider and tell them that you have or may have COVID-19. This information will help the healthcare provider’s office take steps to keep other people from getting infected or exposed.    Wear a facemask  You  should wear a facemask when you are around other people (e.g., sharing a room or vehicle) or pets and before you enter a healthcare provider’s office.     If you are not able to wear a facemask (for example, because it causes trouble breathing), then people who live with you should not stay in the same room with you, or they should wear a facemask if they enter your room.    Cover your coughs and sneezes  • Cover your mouth and nose with a tissue when you cough or sneeze.   • Throw used tissues in a lined trash can.   • Immediately wash your hands with soap and water for at least 20 seconds or, if soap and water are not available, clean your hands with an alcohol-based hand  that contains at least 60% alcohol.    Clean your hands often  • Wash your hands often with soap and water for at least 20 seconds, especially after blowing your nose, coughing, or sneezing; going to the bathroom; and before eating or preparing food.     • If soap and water are not readily available, use an alcohol-based hand  with at least 60% alcohol, covering all surfaces of your hands and rubbing them together until they feel dry.    • Soap and water are the best option if hands are visibly dirty. Avoid touching your eyes, nose, and mouth with unwashed hands.    Avoid sharing personal household items  • You should not share dishes, drinking glasses, cups, eating utensils, towels, or bedding with other people or pets in your home.   • After using these items, they should be washed thoroughly with soap and water.    Clean all “high-touch” surfaces everyday  • High touch surfaces include counters, tabletops, doorknobs, bathroom fixtures, toilets, phones, keyboards, tablets, and bedside tables.   • Also, clean any surfaces that may have blood, stool, or body fluids on them.   • Use a household cleaning spray or wipe, according to the label instructions. Labels contain instructions for safe and effective use of the cleaning  product, including precautions you should take when applying the product, such as wearing gloves and making sure you have good ventilation during use of the product.    Monitor your symptoms  • Seek prompt medical attention if your illness is worsening (e.g., difficulty breathing).   • Before seeking care, call your healthcare provider and tell them that you have, or are being evaluated for, COVID-19.   • Put on a facemask before you enter the facility.     • These steps will help the healthcare provider’s office to keep other people in the office or waiting room from getting infected or exposed.   • Persons who are placed under active monitoring or facilitated self-monitoring should follow instructions provided by their local health department or occupational health professionals, as appropriate.  • If you have a medical emergency and need to call 911, notify the dispatch personnel that you have, or are being evaluated for COVID-19. If possible, put on a facemask before emergency medical services arrive.    Discontinuing home isolation  Patients with confirmed COVID-19 should remain under home isolation precautions until the risk of secondary transmission to others is thought to be low. The decision to discontinue home isolation precautions should be made on a case-by-case basis, in consultation with healthcare providers and state and local health departments.    The below content are for household members, intimate partners, and caregivers of a patient with symptomatic laboratory-confirmed COVID-19 or a patient under investigation:    Household members, intimate partners, and caregivers may have close contact with a person with symptomatic, laboratory-confirmed COVID-19 or a person under investigation.     Close contacts should monitor their health; they should call their healthcare provider right away if they develop symptoms suggestive of COVID-19 (e.g., fever, cough, shortness of breath)     Close contacts  should also follow these recommendations:  • Make sure that you understand and can help the patient follow their healthcare provider’s instructions for medication(s) and care. You should help the patient with basic needs in the home and provide support for getting groceries, prescriptions, and other personal needs.  • Monitor the patient’s symptoms. If the patient is getting sicker, call his or her healthcare provider and tell them that the patient has laboratory-confirmed COVID-19. This will help the healthcare provider’s office take steps to keep other people in the office or waiting room from getting infected. Ask the healthcare provider to call the local or [facility] health department for additional guidance. If the patient has a medical emergency and you need to call 911, notify the dispatch personnel that the patient has, or is being evaluated for COVID-19.  • Household members should stay in another room or be  from the patient as much as possible. Household members should use a separate bedroom and bathroom, if available.  • Prohibit visitors who do not have an essential need to be in the home.  • Household members should care for any pets in the home. Do not handle pets or other animals while sick.  For more information, see COVID-19 and Animals.  • Make sure that shared spaces in the home have good air flow, such as by an air conditioner or an opened window, weather permitting.  • Perform hand hygiene frequently. Wash your hands often with soap and water for at least 20 seconds or use an alcohol-based hand  that contains 60 to 95% alcohol, covering all surfaces of your hands and rubbing them together until they feel dry. Soap and water should be used preferentially if hands are visibly dirty.  • Avoid touching your eyes, nose, and mouth with unwashed hands.  • The patient should wear a facemask when you are around other people. If the patient is not able to wear a facemask (for example,  because it causes trouble breathing), you, as the caregiver, should wear a mask when you are in the same room as the patient.  • Wear a disposable facemask and gloves when you touch or have contact with the patient’s blood, stool, or body fluids, such as saliva, sputum, nasal mucus, vomit, or urine.   o Throw out disposable facemasks and gloves after using them. Do not reuse.  o When removing personal protective equipment, first remove and dispose of gloves. Then, immediately clean your hands with soap and water or alcohol-based hand . Next, remove and dispose of facemask, and immediately clean your hands again with soap and water or alcohol-based hand .  • Avoid sharing household items with the patient. You should not share dishes, drinking glasses, cups, eating utensils, towels, bedding, or other items. After the patient uses these items, you should wash them thoroughly (see below “Wash laundry thoroughly”).  • Clean all “high-touch” surfaces, such as counters, tabletops, doorknobs, bathroom fixtures, toilets, phones, keyboards, tablets, and bedside tables, every day. Also, clean any surfaces that may have blood, stool, or body fluids on them.   o Use a household cleaning spray or wipe, according to the label instructions. Labels contain instructions for safe and effective use of the cleaning product including precautions you should take when applying the product, such as wearing gloves and making sure you have good ventilation during use of the product.  • Wash laundry thoroughly.   o Immediately remove and wash clothes or bedding that have blood, stool, or body fluids on them.  o Wear disposable gloves while handling soiled items and keep soiled items away from your body. Clean your hands (with soap and water or an alcohol-based hand ) immediately after removing your gloves.  o Read and follow directions on labels of laundry or clothing items and detergent. In general, using a normal  laundry detergent according to washing machine instructions and dry thoroughly using the warmest temperatures recommended on the clothing label.  • Place all used disposable gloves, facemasks, and other contaminated items in a lined container before disposing of them with other household waste. Clean your hands (with soap and water or an alcohol-based hand ) immediately after handling these items. Soap and water should be used preferentially if hands are visibly dirty.  • Discuss any additional questions with your state or local health department or healthcare provider.    Adapted from information provided by the Centers for Disease Control and Prevention.  For more information, visit https://www.cdc.gov/coronavirus/2019-ncov/hcp/guidance-prevent-spread.html

## 2024-07-15 ENCOUNTER — OFFICE (OUTPATIENT)
Dept: URBAN - METROPOLITAN AREA CLINIC 76 | Facility: CLINIC | Age: 54
End: 2024-07-15
Payer: COMMERCIAL

## 2024-07-15 VITALS
WEIGHT: 134 LBS | OXYGEN SATURATION: 98 % | HEIGHT: 61 IN | DIASTOLIC BLOOD PRESSURE: 70 MMHG | HEART RATE: 68 BPM | SYSTOLIC BLOOD PRESSURE: 116 MMHG

## 2024-07-15 DIAGNOSIS — R63.4 ABNORMAL WEIGHT LOSS: ICD-10-CM

## 2024-07-15 DIAGNOSIS — Z87.19 PERSONAL HISTORY OF OTHER DISEASES OF THE DIGESTIVE SYSTEM: ICD-10-CM

## 2024-07-15 DIAGNOSIS — R13.10 DYSPHAGIA, UNSPECIFIED: ICD-10-CM

## 2024-07-15 DIAGNOSIS — K21.9 GASTRO-ESOPHAGEAL REFLUX DISEASE WITHOUT ESOPHAGITIS: ICD-10-CM

## 2024-07-15 DIAGNOSIS — K22.4 DYSKINESIA OF ESOPHAGUS: ICD-10-CM

## 2024-07-15 PROCEDURE — 99204 OFFICE O/P NEW MOD 45 MIN: CPT | Performed by: INTERNAL MEDICINE

## 2024-07-26 ENCOUNTER — ON CAMPUS - OUTPATIENT (OUTPATIENT)
Dept: URBAN - METROPOLITAN AREA HOSPITAL 108 | Facility: HOSPITAL | Age: 54
End: 2024-07-26
Payer: COMMERCIAL

## 2024-07-26 DIAGNOSIS — K29.50 UNSPECIFIED CHRONIC GASTRITIS WITHOUT BLEEDING: ICD-10-CM

## 2024-07-26 DIAGNOSIS — R13.10 DYSPHAGIA, UNSPECIFIED: ICD-10-CM

## 2024-07-26 DIAGNOSIS — K22.89 OTHER SPECIFIED DISEASE OF ESOPHAGUS: ICD-10-CM

## 2024-07-26 DIAGNOSIS — K31.89 OTHER DISEASES OF STOMACH AND DUODENUM: ICD-10-CM

## 2024-07-26 PROCEDURE — 43239 EGD BIOPSY SINGLE/MULTIPLE: CPT | Mod: 59 | Performed by: INTERNAL MEDICINE

## 2024-07-26 PROCEDURE — 43249 ESOPH EGD DILATION <30 MM: CPT | Performed by: INTERNAL MEDICINE

## 2024-08-12 ENCOUNTER — OFFICE (OUTPATIENT)
Age: 54
End: 2024-08-12
Payer: COMMERCIAL

## 2024-08-12 ENCOUNTER — OFFICE (OUTPATIENT)
Dept: URBAN - METROPOLITAN AREA CLINIC 76 | Facility: CLINIC | Age: 54
End: 2024-08-12
Payer: COMMERCIAL

## 2024-08-12 VITALS
HEART RATE: 81 BPM | HEIGHT: 61 IN | WEIGHT: 133 LBS | WEIGHT: 133 LBS | DIASTOLIC BLOOD PRESSURE: 78 MMHG | HEIGHT: 61 IN | SYSTOLIC BLOOD PRESSURE: 112 MMHG | WEIGHT: 133 LBS | OXYGEN SATURATION: 98 % | HEART RATE: 81 BPM | HEART RATE: 81 BPM | HEIGHT: 61 IN | OXYGEN SATURATION: 98 % | HEIGHT: 61 IN | OXYGEN SATURATION: 98 % | OXYGEN SATURATION: 98 % | DIASTOLIC BLOOD PRESSURE: 78 MMHG | OXYGEN SATURATION: 98 % | WEIGHT: 133 LBS | OXYGEN SATURATION: 98 % | SYSTOLIC BLOOD PRESSURE: 112 MMHG | WEIGHT: 133 LBS | HEART RATE: 81 BPM | DIASTOLIC BLOOD PRESSURE: 78 MMHG | SYSTOLIC BLOOD PRESSURE: 112 MMHG | WEIGHT: 133 LBS | HEART RATE: 81 BPM | HEART RATE: 81 BPM | HEIGHT: 61 IN | OXYGEN SATURATION: 98 % | HEART RATE: 81 BPM | SYSTOLIC BLOOD PRESSURE: 112 MMHG | WEIGHT: 133 LBS | DIASTOLIC BLOOD PRESSURE: 78 MMHG | HEIGHT: 61 IN | HEIGHT: 61 IN | SYSTOLIC BLOOD PRESSURE: 112 MMHG | DIASTOLIC BLOOD PRESSURE: 78 MMHG | DIASTOLIC BLOOD PRESSURE: 78 MMHG | SYSTOLIC BLOOD PRESSURE: 112 MMHG | DIASTOLIC BLOOD PRESSURE: 78 MMHG | SYSTOLIC BLOOD PRESSURE: 112 MMHG

## 2024-08-12 DIAGNOSIS — K21.9 GASTRO-ESOPHAGEAL REFLUX DISEASE WITHOUT ESOPHAGITIS: ICD-10-CM

## 2024-08-12 DIAGNOSIS — R07.89 OTHER CHEST PAIN: ICD-10-CM

## 2024-08-12 DIAGNOSIS — R13.19 OTHER DYSPHAGIA: ICD-10-CM

## 2024-08-12 PROCEDURE — 99214 OFFICE O/P EST MOD 30 MIN: CPT

## 2024-10-23 ENCOUNTER — OFFICE (OUTPATIENT)
Age: 54
End: 2024-10-23
Payer: COMMERCIAL

## 2024-10-23 ENCOUNTER — OFFICE (OUTPATIENT)
Dept: URBAN - METROPOLITAN AREA CLINIC 76 | Facility: CLINIC | Age: 54
End: 2024-10-23
Payer: COMMERCIAL

## 2024-10-23 VITALS
HEART RATE: 69 BPM | HEART RATE: 69 BPM | SYSTOLIC BLOOD PRESSURE: 142 MMHG | SYSTOLIC BLOOD PRESSURE: 142 MMHG | DIASTOLIC BLOOD PRESSURE: 79 MMHG | WEIGHT: 134 LBS | SYSTOLIC BLOOD PRESSURE: 110 MMHG | HEIGHT: 61 IN | DIASTOLIC BLOOD PRESSURE: 79 MMHG | WEIGHT: 134 LBS | DIASTOLIC BLOOD PRESSURE: 79 MMHG | DIASTOLIC BLOOD PRESSURE: 79 MMHG | HEIGHT: 61 IN | HEART RATE: 69 BPM | HEART RATE: 69 BPM | WEIGHT: 134 LBS | DIASTOLIC BLOOD PRESSURE: 71 MMHG | HEIGHT: 61 IN | SYSTOLIC BLOOD PRESSURE: 142 MMHG | DIASTOLIC BLOOD PRESSURE: 71 MMHG | SYSTOLIC BLOOD PRESSURE: 110 MMHG | DIASTOLIC BLOOD PRESSURE: 71 MMHG | SYSTOLIC BLOOD PRESSURE: 110 MMHG | SYSTOLIC BLOOD PRESSURE: 110 MMHG | DIASTOLIC BLOOD PRESSURE: 71 MMHG | SYSTOLIC BLOOD PRESSURE: 142 MMHG | DIASTOLIC BLOOD PRESSURE: 79 MMHG | SYSTOLIC BLOOD PRESSURE: 110 MMHG | WEIGHT: 134 LBS | DIASTOLIC BLOOD PRESSURE: 71 MMHG | DIASTOLIC BLOOD PRESSURE: 71 MMHG | SYSTOLIC BLOOD PRESSURE: 142 MMHG | HEART RATE: 69 BPM | WEIGHT: 134 LBS | HEIGHT: 61 IN | HEIGHT: 61 IN | DIASTOLIC BLOOD PRESSURE: 79 MMHG | WEIGHT: 134 LBS | SYSTOLIC BLOOD PRESSURE: 110 MMHG | HEIGHT: 61 IN | HEART RATE: 69 BPM | DIASTOLIC BLOOD PRESSURE: 79 MMHG | SYSTOLIC BLOOD PRESSURE: 110 MMHG | SYSTOLIC BLOOD PRESSURE: 142 MMHG | DIASTOLIC BLOOD PRESSURE: 71 MMHG | SYSTOLIC BLOOD PRESSURE: 142 MMHG | HEART RATE: 69 BPM | WEIGHT: 134 LBS | HEIGHT: 61 IN

## 2024-10-23 DIAGNOSIS — K21.9 GASTRO-ESOPHAGEAL REFLUX DISEASE WITHOUT ESOPHAGITIS: ICD-10-CM

## 2024-10-23 DIAGNOSIS — R13.10 DYSPHAGIA, UNSPECIFIED: ICD-10-CM

## 2024-10-23 PROCEDURE — 99214 OFFICE O/P EST MOD 30 MIN: CPT

## 2024-10-23 RX ORDER — VONOPRAZAN FUMARATE 13.36 MG/1
10 TABLET ORAL
Qty: 90 | Refills: 3 | Status: ACTIVE
Start: 2024-10-23

## (undated) DEVICE — DRSNG WND GZ CURAD OIL EMULSION 3X3IN STRL

## (undated) DEVICE — GLV SURG BIOGEL LTX PF 6 1/2

## (undated) DEVICE — BLD DISSCT COOL CUT SJ CRVD 4MM 13CM

## (undated) DEVICE — SKIN PREP TRAY W/CHG: Brand: MEDLINE INDUSTRIES, INC.

## (undated) DEVICE — BNDG ELAS ELITE V/CLOSE 4IN 5YD LF STRL

## (undated) DEVICE — SUT ETHLN 3/0 PS1 18IN 1663H

## (undated) DEVICE — DISPOSABLE TOURNIQUET CUFF SINGLE BLADDER, SINGLE PORT AND QUICK CONNECT CONNECTOR: Brand: COLOR CUFF

## (undated) DEVICE — PK ARTHSCP 40

## (undated) DEVICE — UNDERCAST PADDING: Brand: DEROYAL

## (undated) DEVICE — TBG ARTHSCP PT W CONN/REDUC 8FT

## (undated) DEVICE — ABL APOLLO RF M/PRT 50D